# Patient Record
Sex: FEMALE | Race: ASIAN | NOT HISPANIC OR LATINO | URBAN - METROPOLITAN AREA
[De-identification: names, ages, dates, MRNs, and addresses within clinical notes are randomized per-mention and may not be internally consistent; named-entity substitution may affect disease eponyms.]

---

## 2019-10-30 RX ORDER — INSULIN DETEMIR 100/ML (3)
10 INSULIN PEN (ML) SUBCUTANEOUS
Qty: 0 | Refills: 0 | DISCHARGE
Start: 2019-10-30

## 2019-10-30 RX ORDER — INSULIN DETEMIR 100/ML (3)
30 INSULIN PEN (ML) SUBCUTANEOUS
Qty: 0 | Refills: 0 | DISCHARGE
Start: 2019-10-30

## 2019-11-01 ENCOUNTER — INPATIENT (INPATIENT)
Facility: HOSPITAL | Age: 79
LOS: 2 days | Discharge: HOME | End: 2019-11-04
Attending: HOSPITALIST | Admitting: HOSPITALIST
Payer: MEDICARE

## 2019-11-01 VITALS
HEART RATE: 83 BPM | RESPIRATION RATE: 20 BRPM | SYSTOLIC BLOOD PRESSURE: 140 MMHG | TEMPERATURE: 98 F | DIASTOLIC BLOOD PRESSURE: 58 MMHG | OXYGEN SATURATION: 100 %

## 2019-11-01 DIAGNOSIS — E03.9 HYPOTHYROIDISM, UNSPECIFIED: ICD-10-CM

## 2019-11-01 DIAGNOSIS — J18.9 PNEUMONIA, UNSPECIFIED ORGANISM: ICD-10-CM

## 2019-11-01 DIAGNOSIS — E11.9 TYPE 2 DIABETES MELLITUS WITHOUT COMPLICATIONS: ICD-10-CM

## 2019-11-01 DIAGNOSIS — R94.5 ABNORMAL RESULTS OF LIVER FUNCTION STUDIES: ICD-10-CM

## 2019-11-01 DIAGNOSIS — A41.9 SEPSIS, UNSPECIFIED ORGANISM: ICD-10-CM

## 2019-11-01 DIAGNOSIS — Z02.9 ENCOUNTER FOR ADMINISTRATIVE EXAMINATIONS, UNSPECIFIED: ICD-10-CM

## 2019-11-01 DIAGNOSIS — R06.02 SHORTNESS OF BREATH: ICD-10-CM

## 2019-11-01 DIAGNOSIS — Z29.9 ENCOUNTER FOR PROPHYLACTIC MEASURES, UNSPECIFIED: ICD-10-CM

## 2019-11-01 DIAGNOSIS — I10 ESSENTIAL (PRIMARY) HYPERTENSION: ICD-10-CM

## 2019-11-01 LAB
ALBUMIN SERPL ELPH-MCNC: 3.6 G/DL — SIGNIFICANT CHANGE UP (ref 3.3–5)
ALP SERPL-CCNC: 184 U/L — HIGH (ref 40–120)
ALT FLD-CCNC: 40 U/L — HIGH (ref 4–33)
ANION GAP SERPL CALC-SCNC: 17 MMO/L — HIGH (ref 7–14)
ANISOCYTOSIS BLD QL: SLIGHT — SIGNIFICANT CHANGE UP
APPEARANCE UR: CLEAR — SIGNIFICANT CHANGE UP
APTT BLD: 30.4 SEC — SIGNIFICANT CHANGE UP (ref 27.5–36.3)
AST SERPL-CCNC: 43 U/L — HIGH (ref 4–32)
B PERT DNA SPEC QL NAA+PROBE: NOT DETECTED — SIGNIFICANT CHANGE UP
BACTERIA # UR AUTO: NEGATIVE — SIGNIFICANT CHANGE UP
BASE EXCESS BLDV CALC-SCNC: 5.9 MMOL/L — SIGNIFICANT CHANGE UP
BASE EXCESS BLDV CALC-SCNC: 6.2 MMOL/L — SIGNIFICANT CHANGE UP
BASOPHILS # BLD AUTO: 0.02 K/UL — SIGNIFICANT CHANGE UP (ref 0–0.2)
BASOPHILS NFR BLD AUTO: 0.2 % — SIGNIFICANT CHANGE UP (ref 0–2)
BASOPHILS NFR SPEC: 0 % — SIGNIFICANT CHANGE UP (ref 0–2)
BILIRUB SERPL-MCNC: 0.9 MG/DL — SIGNIFICANT CHANGE UP (ref 0.2–1.2)
BILIRUB UR-MCNC: NEGATIVE — SIGNIFICANT CHANGE UP
BLOOD GAS VENOUS - CREATININE: 0.72 MG/DL — SIGNIFICANT CHANGE UP (ref 0.5–1.3)
BLOOD GAS VENOUS - CREATININE: 0.81 MG/DL — SIGNIFICANT CHANGE UP (ref 0.5–1.3)
BLOOD UR QL VISUAL: NEGATIVE — SIGNIFICANT CHANGE UP
BUN SERPL-MCNC: 8 MG/DL — SIGNIFICANT CHANGE UP (ref 7–23)
C PNEUM DNA SPEC QL NAA+PROBE: NOT DETECTED — SIGNIFICANT CHANGE UP
CALCIUM SERPL-MCNC: 9.6 MG/DL — SIGNIFICANT CHANGE UP (ref 8.4–10.5)
CHLORIDE BLDV-SCNC: 104 MMOL/L — SIGNIFICANT CHANGE UP (ref 96–108)
CHLORIDE BLDV-SCNC: 99 MMOL/L — SIGNIFICANT CHANGE UP (ref 96–108)
CHLORIDE SERPL-SCNC: 93 MMOL/L — LOW (ref 98–107)
CO2 SERPL-SCNC: 26 MMOL/L — SIGNIFICANT CHANGE UP (ref 22–31)
COLOR SPEC: SIGNIFICANT CHANGE UP
CREAT SERPL-MCNC: 0.84 MG/DL — SIGNIFICANT CHANGE UP (ref 0.5–1.3)
EOSINOPHIL # BLD AUTO: 0.06 K/UL — SIGNIFICANT CHANGE UP (ref 0–0.5)
EOSINOPHIL NFR BLD AUTO: 0.7 % — SIGNIFICANT CHANGE UP (ref 0–6)
EOSINOPHIL NFR FLD: 2 % — SIGNIFICANT CHANGE UP (ref 0–6)
FLUAV H1 2009 PAND RNA SPEC QL NAA+PROBE: DETECTED — HIGH
FLUAV H1 RNA SPEC QL NAA+PROBE: NOT DETECTED — SIGNIFICANT CHANGE UP
FLUAV H3 RNA SPEC QL NAA+PROBE: NOT DETECTED — SIGNIFICANT CHANGE UP
FLUBV RNA SPEC QL NAA+PROBE: NOT DETECTED — SIGNIFICANT CHANGE UP
GAS PNL BLDV: 133 MMOL/L — LOW (ref 136–146)
GAS PNL BLDV: 134 MMOL/L — LOW (ref 136–146)
GIANT PLATELETS BLD QL SMEAR: PRESENT — SIGNIFICANT CHANGE UP
GLUCOSE BLDC GLUCOMTR-MCNC: 215 MG/DL — HIGH (ref 70–99)
GLUCOSE BLDV-MCNC: 104 MG/DL — HIGH (ref 70–99)
GLUCOSE BLDV-MCNC: 75 MG/DL — SIGNIFICANT CHANGE UP (ref 70–99)
GLUCOSE SERPL-MCNC: 102 MG/DL — HIGH (ref 70–99)
GLUCOSE UR-MCNC: NEGATIVE — SIGNIFICANT CHANGE UP
HADV DNA SPEC QL NAA+PROBE: NOT DETECTED — SIGNIFICANT CHANGE UP
HCO3 BLDV-SCNC: 28 MMOL/L — HIGH (ref 20–27)
HCO3 BLDV-SCNC: 29 MMOL/L — HIGH (ref 20–27)
HCOV PNL SPEC NAA+PROBE: SIGNIFICANT CHANGE UP
HCT VFR BLD CALC: 34.6 % — SIGNIFICANT CHANGE UP (ref 34.5–45)
HCT VFR BLDV CALC: 34 % — LOW (ref 34.5–45)
HCT VFR BLDV CALC: 34.5 % — SIGNIFICANT CHANGE UP (ref 34.5–45)
HGB BLD-MCNC: 11.3 G/DL — LOW (ref 11.5–15.5)
HGB BLDV-MCNC: 11 G/DL — LOW (ref 11.5–15.5)
HGB BLDV-MCNC: 11.2 G/DL — LOW (ref 11.5–15.5)
HMPV RNA SPEC QL NAA+PROBE: NOT DETECTED — SIGNIFICANT CHANGE UP
HPIV1 RNA SPEC QL NAA+PROBE: NOT DETECTED — SIGNIFICANT CHANGE UP
HPIV2 RNA SPEC QL NAA+PROBE: NOT DETECTED — SIGNIFICANT CHANGE UP
HPIV3 RNA SPEC QL NAA+PROBE: NOT DETECTED — SIGNIFICANT CHANGE UP
HPIV4 RNA SPEC QL NAA+PROBE: NOT DETECTED — SIGNIFICANT CHANGE UP
HYALINE CASTS # UR AUTO: NEGATIVE — SIGNIFICANT CHANGE UP
IMM GRANULOCYTES NFR BLD AUTO: 5.7 % — HIGH (ref 0–1.5)
INR BLD: 0.84 — LOW (ref 0.88–1.17)
KETONES UR-MCNC: SIGNIFICANT CHANGE UP
LACTATE BLDV-MCNC: 1.8 MMOL/L — SIGNIFICANT CHANGE UP (ref 0.5–2)
LACTATE BLDV-MCNC: 2.4 MMOL/L — HIGH (ref 0.5–2)
LEUKOCYTE ESTERASE UR-ACNC: NEGATIVE — SIGNIFICANT CHANGE UP
LIDOCAIN IGE QN: 116.5 U/L — HIGH (ref 7–60)
LYMPHOCYTES # BLD AUTO: 0.83 K/UL — LOW (ref 1–3.3)
LYMPHOCYTES # BLD AUTO: 9.3 % — LOW (ref 13–44)
LYMPHOCYTES NFR SPEC AUTO: 6 % — LOW (ref 13–44)
MACROCYTES BLD QL: SLIGHT — SIGNIFICANT CHANGE UP
MANUAL SMEAR VERIFICATION: SIGNIFICANT CHANGE UP
MCHC RBC-ENTMCNC: 26.6 PG — LOW (ref 27–34)
MCHC RBC-ENTMCNC: 32.7 % — SIGNIFICANT CHANGE UP (ref 32–36)
MCV RBC AUTO: 81.4 FL — SIGNIFICANT CHANGE UP (ref 80–100)
MONOCYTES # BLD AUTO: 0.9 K/UL — SIGNIFICANT CHANGE UP (ref 0–0.9)
MONOCYTES NFR BLD AUTO: 10 % — SIGNIFICANT CHANGE UP (ref 2–14)
MONOCYTES NFR BLD: 8 % — SIGNIFICANT CHANGE UP (ref 2–9)
NEUTROPHIL AB SER-ACNC: 73 % — SIGNIFICANT CHANGE UP (ref 43–77)
NEUTROPHILS # BLD AUTO: 6.64 K/UL — SIGNIFICANT CHANGE UP (ref 1.8–7.4)
NEUTROPHILS NFR BLD AUTO: 74.1 % — SIGNIFICANT CHANGE UP (ref 43–77)
NEUTS BAND # BLD: 10 % — HIGH (ref 0–6)
NITRITE UR-MCNC: NEGATIVE — SIGNIFICANT CHANGE UP
NRBC # BLD: 0 /100WBC — SIGNIFICANT CHANGE UP
NRBC # FLD: 0 K/UL — SIGNIFICANT CHANGE UP (ref 0–0)
PCO2 BLDV: 42 MMHG — SIGNIFICANT CHANGE UP (ref 41–51)
PCO2 BLDV: 52 MMHG — HIGH (ref 41–51)
PH BLDV: 7.39 PH — SIGNIFICANT CHANGE UP (ref 7.32–7.43)
PH BLDV: 7.47 PH — HIGH (ref 7.32–7.43)
PH UR: 7 — SIGNIFICANT CHANGE UP (ref 5–8)
PLATELET # BLD AUTO: 392 K/UL — SIGNIFICANT CHANGE UP (ref 150–400)
PLATELET COUNT - ESTIMATE: NORMAL — SIGNIFICANT CHANGE UP
PMV BLD: 9.6 FL — SIGNIFICANT CHANGE UP (ref 7–13)
PO2 BLDV: 27 MMHG — LOW (ref 35–40)
PO2 BLDV: < 24 MMHG — LOW (ref 35–40)
POLYCHROMASIA BLD QL SMEAR: SLIGHT — SIGNIFICANT CHANGE UP
POTASSIUM BLDV-SCNC: 3 MMOL/L — LOW (ref 3.4–4.5)
POTASSIUM BLDV-SCNC: 3.1 MMOL/L — LOW (ref 3.4–4.5)
POTASSIUM SERPL-MCNC: 3.1 MMOL/L — LOW (ref 3.5–5.3)
POTASSIUM SERPL-SCNC: 3.1 MMOL/L — LOW (ref 3.5–5.3)
PROT SERPL-MCNC: 7.4 G/DL — SIGNIFICANT CHANGE UP (ref 6–8.3)
PROT UR-MCNC: 20 — SIGNIFICANT CHANGE UP
PROTHROM AB SERPL-ACNC: 9.5 SEC — LOW (ref 9.8–13.1)
RBC # BLD: 4.25 M/UL — SIGNIFICANT CHANGE UP (ref 3.8–5.2)
RBC # FLD: 14.9 % — HIGH (ref 10.3–14.5)
RBC CASTS # UR COMP ASSIST: SIGNIFICANT CHANGE UP (ref 0–?)
RSV RNA SPEC QL NAA+PROBE: NOT DETECTED — SIGNIFICANT CHANGE UP
RV+EV RNA SPEC QL NAA+PROBE: NOT DETECTED — SIGNIFICANT CHANGE UP
SAO2 % BLDV: 33.9 % — LOW (ref 60–85)
SAO2 % BLDV: 45.7 % — LOW (ref 60–85)
SODIUM SERPL-SCNC: 136 MMOL/L — SIGNIFICANT CHANGE UP (ref 135–145)
SP GR SPEC: 1.01 — SIGNIFICANT CHANGE UP (ref 1–1.04)
SQUAMOUS # UR AUTO: SIGNIFICANT CHANGE UP
TSH SERPL-MCNC: 25.43 UIU/ML — HIGH (ref 0.27–4.2)
UROBILINOGEN FLD QL: NORMAL — SIGNIFICANT CHANGE UP
VARIANT LYMPHS # BLD: 1 % — SIGNIFICANT CHANGE UP
WBC # BLD: 8.96 K/UL — SIGNIFICANT CHANGE UP (ref 3.8–10.5)
WBC # FLD AUTO: 8.96 K/UL — SIGNIFICANT CHANGE UP (ref 3.8–10.5)
WBC UR QL: SIGNIFICANT CHANGE UP (ref 0–?)

## 2019-11-01 PROCEDURE — 76705 ECHO EXAM OF ABDOMEN: CPT | Mod: 26

## 2019-11-01 PROCEDURE — 71046 X-RAY EXAM CHEST 2 VIEWS: CPT | Mod: 26

## 2019-11-01 PROCEDURE — 99223 1ST HOSP IP/OBS HIGH 75: CPT | Mod: GC

## 2019-11-01 RX ORDER — SODIUM CHLORIDE 9 MG/ML
1000 INJECTION, SOLUTION INTRAVENOUS
Refills: 0 | Status: DISCONTINUED | OUTPATIENT
Start: 2019-11-01 | End: 2019-11-04

## 2019-11-01 RX ORDER — DEXTROSE 50 % IN WATER 50 %
25 SYRINGE (ML) INTRAVENOUS ONCE
Refills: 0 | Status: DISCONTINUED | OUTPATIENT
Start: 2019-11-01 | End: 2019-11-04

## 2019-11-01 RX ORDER — DEXTROSE 50 % IN WATER 50 %
15 SYRINGE (ML) INTRAVENOUS ONCE
Refills: 0 | Status: DISCONTINUED | OUTPATIENT
Start: 2019-11-01 | End: 2019-11-04

## 2019-11-01 RX ORDER — POTASSIUM CHLORIDE 20 MEQ
40 PACKET (EA) ORAL ONCE
Refills: 0 | Status: COMPLETED | OUTPATIENT
Start: 2019-11-01 | End: 2019-11-01

## 2019-11-01 RX ORDER — GLUCAGON INJECTION, SOLUTION 0.5 MG/.1ML
1 INJECTION, SOLUTION SUBCUTANEOUS ONCE
Refills: 0 | Status: DISCONTINUED | OUTPATIENT
Start: 2019-11-01 | End: 2019-11-04

## 2019-11-01 RX ORDER — NIACIN 50 MG
1 TABLET ORAL
Qty: 0 | Refills: 0 | DISCHARGE

## 2019-11-01 RX ORDER — SODIUM CHLORIDE 9 MG/ML
1000 INJECTION INTRAMUSCULAR; INTRAVENOUS; SUBCUTANEOUS ONCE
Refills: 0 | Status: COMPLETED | OUTPATIENT
Start: 2019-11-01 | End: 2019-11-01

## 2019-11-01 RX ORDER — DEXTROSE 50 % IN WATER 50 %
12.5 SYRINGE (ML) INTRAVENOUS ONCE
Refills: 0 | Status: DISCONTINUED | OUTPATIENT
Start: 2019-11-01 | End: 2019-11-04

## 2019-11-01 RX ORDER — AMLODIPINE BESYLATE 2.5 MG/1
5 TABLET ORAL DAILY
Refills: 0 | Status: DISCONTINUED | OUTPATIENT
Start: 2019-11-01 | End: 2019-11-04

## 2019-11-01 RX ORDER — VANCOMYCIN HCL 1 G
750 VIAL (EA) INTRAVENOUS EVERY 12 HOURS
Refills: 0 | Status: DISCONTINUED | OUTPATIENT
Start: 2019-11-01 | End: 2019-11-02

## 2019-11-01 RX ORDER — INSULIN LISPRO 100/ML
VIAL (ML) SUBCUTANEOUS AT BEDTIME
Refills: 0 | Status: DISCONTINUED | OUTPATIENT
Start: 2019-11-01 | End: 2019-11-04

## 2019-11-01 RX ORDER — PIPERACILLIN AND TAZOBACTAM 4; .5 G/20ML; G/20ML
3.38 INJECTION, POWDER, LYOPHILIZED, FOR SOLUTION INTRAVENOUS ONCE
Refills: 0 | Status: COMPLETED | OUTPATIENT
Start: 2019-11-01 | End: 2019-11-01

## 2019-11-01 RX ORDER — VANCOMYCIN HCL 1 G
1000 VIAL (EA) INTRAVENOUS ONCE
Refills: 0 | Status: COMPLETED | OUTPATIENT
Start: 2019-11-01 | End: 2019-11-01

## 2019-11-01 RX ORDER — PIPERACILLIN AND TAZOBACTAM 4; .5 G/20ML; G/20ML
3.38 INJECTION, POWDER, LYOPHILIZED, FOR SOLUTION INTRAVENOUS EVERY 8 HOURS
Refills: 0 | Status: DISCONTINUED | OUTPATIENT
Start: 2019-11-01 | End: 2019-11-02

## 2019-11-01 RX ORDER — LEVOTHYROXINE SODIUM 125 MCG
88 TABLET ORAL DAILY
Refills: 0 | Status: DISCONTINUED | OUTPATIENT
Start: 2019-11-01 | End: 2019-11-02

## 2019-11-01 RX ORDER — IPRATROPIUM/ALBUTEROL SULFATE 18-103MCG
3 AEROSOL WITH ADAPTER (GRAM) INHALATION EVERY 6 HOURS
Refills: 0 | Status: DISCONTINUED | OUTPATIENT
Start: 2019-11-01 | End: 2019-11-04

## 2019-11-01 RX ORDER — LISINOPRIL 2.5 MG/1
1 TABLET ORAL
Qty: 0 | Refills: 0 | DISCHARGE

## 2019-11-01 RX ORDER — ENOXAPARIN SODIUM 100 MG/ML
40 INJECTION SUBCUTANEOUS DAILY
Refills: 0 | Status: DISCONTINUED | OUTPATIENT
Start: 2019-11-01 | End: 2019-11-04

## 2019-11-01 RX ORDER — INSULIN LISPRO 100/ML
VIAL (ML) SUBCUTANEOUS
Refills: 0 | Status: DISCONTINUED | OUTPATIENT
Start: 2019-11-01 | End: 2019-11-04

## 2019-11-01 RX ADMIN — Medication 30 MILLIGRAM(S): at 23:37

## 2019-11-01 RX ADMIN — PIPERACILLIN AND TAZOBACTAM 25 GRAM(S): 4; .5 INJECTION, POWDER, LYOPHILIZED, FOR SOLUTION INTRAVENOUS at 23:38

## 2019-11-01 RX ADMIN — Medication 250 MILLIGRAM(S): at 17:08

## 2019-11-01 RX ADMIN — PIPERACILLIN AND TAZOBACTAM 200 GRAM(S): 4; .5 INJECTION, POWDER, LYOPHILIZED, FOR SOLUTION INTRAVENOUS at 10:36

## 2019-11-01 RX ADMIN — SODIUM CHLORIDE 1000 MILLILITER(S): 9 INJECTION INTRAMUSCULAR; INTRAVENOUS; SUBCUTANEOUS at 10:36

## 2019-11-01 RX ADMIN — Medication 40 MILLIEQUIVALENT(S): at 12:46

## 2019-11-01 NOTE — H&P ADULT - HISTORY OF PRESENT ILLNESS
79F hx of T2DM, HTN, hypothyroidism and recent hospitalization for septic shock 2/2 klebsiella bacteremia due to cholangitis (in [place]) presenting with one day of shortness of breath. Per patient and medical record review, patient was visiting family in [facility] and while there she developed cough and RUQ pain. During flight back to [facility], patient developed confusion and pre-syncopal event and the flight was diverted to [facility]. She was taken to hospital where she was admitted from 10/24-10/29/19. Per review of records, CTPA was negative for PE. She initially required pressor support and was admitted to the ICU for septic shock 2/2 Klebsiella pneumoniae due to presumed ascending cholangitis. Abs US showed dilated GB w/ dilation of intra-hepatic ducts. She was discharged on Cipro 500mg BID to be completed on 11/5. Patient returned to [facility] on 10/29 and saw her PCP the next day. She was taken off her diabetic medications and started on Lantus 30U qHS. She states that she was feeling well until this AM, where she developed shortness of breath. 79F hx of T2DM, HTN, hypothyroidism and recent hospitalization for septic shock 2/2 klebsiella bacteremia due to cholangitis (in [place]) presenting with one day of shortness of breath. Per patient and medical record review, patient was visiting family in [facility] and while there she developed cough and RUQ pain. During flight back to [facility], patient developed confusion and pre-syncopal event and the flight was diverted to [facility]. She was taken to hospital where she was admitted from 10/24-10/29/19. Per review of records, CTPA was negative for PE. She initially required pressor support and was admitted to the ICU for septic shock 2/2 Klebsiella pneumoniae due to presumed ascending cholangitis. Abs US showed dilated GB w/ dilation of intra-hepatic ducts. She was discharged on Cipro 500mg BID to be completed on 11/5.     Patient returned to [facility] on 10/29 and saw her PCP the next day. She was taken off her diabetic medications and started on Lantus 30U qHS. She states that she was feeling well until this AM, where she developed shortness of breath. She also endorsing alternating chills and fevers, weakness and productive cough. She states her abdomen has been distended since hospitalization in [place] and endorses feeling "bloated", which improves when she belches. She denies dysuria or increased frequency.

## 2019-11-01 NOTE — H&P ADULT - PROBLEM SELECTOR PLAN 8
1.  Name of PCP: Dr. Vetnura ([place])   2.  PCP Contacted on Admission: [ ] Y    [ ] N    3.  PCP contacted at Discharge: [ ] Y    [ ] N    [ ] N/A  4.  Post-Discharge Appointment Date and Location:  5.  Summary of Handoff given to PCP:

## 2019-11-01 NOTE — ED ADULT TRIAGE NOTE - CHIEF COMPLAINT QUOTE
arrived from [facility],diverted to [place],treated for pneumonia/septic shock as per family,returned from [place] to home on tues.  reports diff breathing since  x past wk with continued weakness,cough. denies pain. fs by ems 65 arrived from [facility],diverted to [place],treated for pneumonia/septic shock as per family,returned from [place] to home on tues.  reports diff breathing since  x past wk with continued weakness,cough. denies pain. fs by ems 67.  fs 60 at present, apple juice given.

## 2019-11-01 NOTE — H&P ADULT - ASSESSMENT
79F hx of T2DM, HTN, hypothyroidism and recent hospitalization for septic shock 2/2 klebsiella bacteremia due to cholangitis (in [place]) presenting with one day of shortness of breath admitted w/ sepsis likely 2/2 PNA. 79F hx of T2DM, HTN, hypothyroidism and recent hospitalization for septic shock 2/2 klebsiella bacteremia due to cholangitis (in [place]) presenting with one day of shortness of breath admitted w/ sepsis likely 2/2 HCAP.

## 2019-11-01 NOTE — ED ADULT NURSE REASSESSMENT NOTE - NS ED NURSE REASSESS COMMENT FT1
Pt awaiting RVP results at this time. Pt denies shortness of breath, breathing even and unlabored. Will continue to monitor.

## 2019-11-01 NOTE — H&P ADULT - NSHPREVIEWOFSYSTEMS_GEN_ALL_CORE
CONSTITUTIONAL: +weakness, fevers or chills  EYES: No visual changes; no blurry vision  ENT: +sore throat w/ swallowing    NECK: No pain or stiffness  RESPIRATORY: +cough and SOB. Denies wheezing, hemoptysis  CARDIOVASCULAR: No chest pain or palpitations; no pleuritic chest pain   GASTROINTESTINAL: No abdominal or epigastric pain. No nausea, vomiting, or hematemesis; No diarrhea or constipation. No melena or hematochezia.  GENITOURINARY: No dysuria, frequency or hematuria  NEUROLOGICAL: No numbness or weakness; no headaches   PSYCH: No SI/HI  SKIN: No itching, rashes

## 2019-11-01 NOTE — H&P ADULT - NSHPLABSRESULTS_GEN_ALL_CORE
EKG personally reviewed: NSR, no YANICK
Airway patent, R TM bulging and erythematous, L TM clear, normal appearing mouth, nose, throat, neck supple with full range of motion, no cervical adenopathy.

## 2019-11-01 NOTE — H&P ADULT - NSICDXPASTMEDICALHX_GEN_ALL_CORE_FT
PAST MEDICAL HISTORY:  Hypertension     Hypothyroidism     Sepsis due to Klebsiella     Type 2 diabetes mellitus

## 2019-11-01 NOTE — H&P ADULT - PROBLEM SELECTOR PLAN 5
- BP well controlled at this time  - Given concern for sepsis, will hold home lisinopril and restart amlodipine w/ hold parameters - C/w home synthroid 88mcg   - F/u TSH level

## 2019-11-01 NOTE — H&P ADULT - PROBLEM SELECTOR PLAN 4
- C/w home synthroid 88mcg   - F/u TSH level - A1c in AM  - Patient hypoglycemic to 60 in ED and was started on Lantus 30U qHS by PCP a few days ago. She was previously on Metformin Glipizide and Sitapliptin, which patient states were just recently stopped    - Will hold all long acting insulin due to hypoglycemic event and monitor FS qAC and qHS on low dose ISS

## 2019-11-01 NOTE — H&P ADULT - PROBLEM SELECTOR PLAN 3
- A1c in AM  - Patient hypoglycemic to 60 in ED and was started on Lantus 30U qHS by PCP a few days ago. She was previously on Metformin Glipizide and Sitapliptin, which patient states were just recently stopped    - Will hold all long acting insulin due to hypoglycemic event and monitor FS qAC and qHS on low dose ISS - Patient w/ recent admission for septic shock due to presumed ascending cholangitis at OSH   - ERCP was deferred at that time and patient had similar abd US findings of GB distention and intrahepatic biliary ductal dilation.   - Currently patient denies RUQ pain.   - Will hold off CT A/P as patient had recent imaging at OSH  - Consider GI eval in AM for possible ERCP given recent hx    - Trend LFTs in AM

## 2019-11-01 NOTE — ED PROVIDER NOTE - CLINICAL SUMMARY MEDICAL DECISION MAKING FREE TEXT BOX
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 Concnern PNA (bacterial vs. viral) given symptoms and hx. Will check labs and perform infectious workup. EKG, CXR, IVF. Will reassess the need for additional interventions as clinically warranted.

## 2019-11-01 NOTE — ED ADULT NURSE NOTE - CHIEF COMPLAINT QUOTE
arrived from [facility],diverted to [place],treated for pneumonia/septic shock as per family,returned from [place] to home on tues.  reports diff breathing since  x past wk with continued weakness,cough. denies pain. fs by ems 67.  fs 60 at present, apple juice given.

## 2019-11-01 NOTE — ED PROVIDER NOTE - ATTENDING CONTRIBUTION TO CARE
79F presents with SOB and cough. Recent diagnosis of sepsis with Klebsiella bacteremia, was on a flight returning from the [facility] last week (Tuesday) when she passed out on the flight, the flight was diverted emergency to [place], where she was taken to the hospital and admitted to the ICU with sepsis, treated w broad spectrum abx, and required pressors. Possible source from cholecystitis because intrahepatic biliary dilation seen on CT abd. Two days ago, patient was discharged to travel back to [place], has been taking cipro but now with SOB and cough.  + fever. Now no abd pain. + generalized weakness.  On exam mild tachypnea, mmm, rrr, lungs decreased BS b/l bases, abd soft NT/ND, 2+ pulses, no edema, no rash, alert, speech clear. CXR with b/l pleural effusions and potential infiltrate, patients treated w broad spectrum abx given recent sepsis. RUQ u/s with similar dilation, LFTs wnl. Plan for admission.

## 2019-11-01 NOTE — ED ADULT NURSE NOTE - OBJECTIVE STATEMENT
Pt presents to ED with sudden onset of shortness of breath that started this morning. Pt AxOx3. Pt had recently travelled from the [facility] passed out on the flight and then had to go to [place] to be admitted to the hospital for septic shock. Pt was discharged approx a week ago and followed up with her MD two days ago. as per daughter patient was fine last night. Pt then called her this morning and started to say the shortness of breath was bad. Pt denies chest pain, lightheadedness and dizziness. Pt breathing even and unlabored. Pt O2 sat 98% on room air. Pt abd soft and nontender. Pt denies abd pain, n/v/d. Pt denies dysuria and hematuria. Skin clean dry and intact. 20G IVL placed in the R arm. Rectal temp 96.4, MD Ferrara made aware, pt placed on daryl hugger at this time. Will continue to monitor.

## 2019-11-01 NOTE — H&P ADULT - PROBLEM SELECTOR PLAN 2
- Likely in - Likely in pneumonia   - CXR w/ hazy opacities and increased marking, on PE patient w/ coarse BS/crackles in RLL concerning for PNA  - Will treat as above   - Duonebs  - Incentive spirometry  - Patient w/ small bilateral pleural effusion. Had TTE @ OSH w/ normal LVSF and mildly reduced RV systolic function. Will avoid further IVF resuscitation at this time to avoid volume overload. - Likely in pneumonia   - CXR w/ hazy opacities and increased marking, on PE patient w/ coarse BS/crackles in RLL concerning for PNA  - Will treat as above   - Duonebs  - Incentive spirometry  - Patient w/ small bilateral pleural effusion on CXR. Had TTE @ OSH w/ normal LVSF and mildly reduced RV systolic function. Will avoid further IVF resuscitation at this time to avoid volume overload.

## 2019-11-01 NOTE — ED ADULT NURSE NOTE - NS ED NOTE ABUSE SUSPICION NEGLECT YN
Patient called back regarding her medication issue and getting a referral to a psychiatrist.      Please call to discuss and okay to leave detailed message.    No

## 2019-11-01 NOTE — ED PROVIDER NOTE - PMH
GERD (gastroesophageal reflux disease)    HLD (hyperlipidemia)    Hypertension    Hypothyroidism    Sepsis due to Klebsiella    Type 2 diabetes mellitus

## 2019-11-01 NOTE — H&P ADULT - PROBLEM SELECTOR PLAN 6
- DVT ppx: lovenox  - Diet: regular  - PT consult - BP well controlled at this time  - Given concern for sepsis, will hold home lisinopril and restart amlodipine w/ hold parameters

## 2019-11-01 NOTE — ED PROVIDER NOTE - OBJECTIVE STATEMENT
78 y/o F w/ PMH of HLD, HTN, Hypothyroid, GERD, DM, prior CVA w/o residual deficits w/ recent hospitalization for Klebsiella sepsis presenting w/ shortness of breath. Pt presents w/ family members. Pt reports shortness of breath that started this morning. Pt called her niece this morning complaining of difficulty breathing and niece called EMS. Pt w/ recent hospitalization in [place] for (per chart review) klebisella, requiring MICU and pressors. Paperwork indicating pt likely was experiencing cholangitis at that time. Pt currently on cipro PO. Pt saw her PCP yesterday and reports feeling well then. Reports chills w/ associated weakness and cough now. Denies fevers, headache, dizziness, blurry vision, chest pain, cough, abdominal pain, n/v/d/c, urinary symptoms, MSK pain, rash.

## 2019-11-01 NOTE — H&P ADULT - NSHPPHYSICALEXAM_GEN_ALL_CORE
GENERAL: NAD, well-developed  HEAD:  Atraumatic, Normocephalic  EYES: EOMI, PERRLA, conjunctiva and sclera clear  ENT: Dry mucus membranes   NECK: Supple, No JVD, no LND   CHEST/LUNG: Crackles at R base; No wheeze/rales/rhonchi   HEART: Regular rate and rhythm; No murmurs, rubs, or gallops  ABDOMEN: Soft, Nontender, mildly distended; Bowel sounds present  EXTREMITIES:  2+ Peripheral Pulses, No clubbing, cyanosis. 1+ edema bilaterally   PSYCH: Appropriate affect   NEUROLOGY: non-focal neuro exam, A&Ox3, moving all ext   SKIN: No rashes or lesions

## 2019-11-01 NOTE — H&P ADULT - PROBLEM SELECTOR PLAN 7
1.  Name of PCP: Dr. Ventura ([place])   2.  PCP Contacted on Admission: [ ] Y    [ ] N    3.  PCP contacted at Discharge: [ ] Y    [ ] N    [ ] N/A  4.  Post-Discharge Appointment Date and Location:  5.  Summary of Handoff given to PCP: - DVT ppx: lovenox  - Diet: regular  - PT consult

## 2019-11-02 LAB
ALBUMIN SERPL ELPH-MCNC: 3 G/DL — LOW (ref 3.3–5)
ALP SERPL-CCNC: 181 U/L — HIGH (ref 40–120)
ALT FLD-CCNC: 33 U/L — SIGNIFICANT CHANGE UP (ref 4–33)
ANION GAP SERPL CALC-SCNC: 13 MMO/L — SIGNIFICANT CHANGE UP (ref 7–14)
AST SERPL-CCNC: 37 U/L — HIGH (ref 4–32)
BACTERIA UR CULT: SIGNIFICANT CHANGE UP
BASOPHILS # BLD AUTO: 0.07 K/UL — SIGNIFICANT CHANGE UP (ref 0–0.2)
BASOPHILS NFR BLD AUTO: 0.8 % — SIGNIFICANT CHANGE UP (ref 0–2)
BILIRUB SERPL-MCNC: 0.7 MG/DL — SIGNIFICANT CHANGE UP (ref 0.2–1.2)
BUN SERPL-MCNC: 7 MG/DL — SIGNIFICANT CHANGE UP (ref 7–23)
CALCIUM SERPL-MCNC: 9 MG/DL — SIGNIFICANT CHANGE UP (ref 8.4–10.5)
CHLORIDE SERPL-SCNC: 96 MMOL/L — LOW (ref 98–107)
CO2 SERPL-SCNC: 24 MMOL/L — SIGNIFICANT CHANGE UP (ref 22–31)
CREAT SERPL-MCNC: 0.89 MG/DL — SIGNIFICANT CHANGE UP (ref 0.5–1.3)
EOSINOPHIL # BLD AUTO: 0.24 K/UL — SIGNIFICANT CHANGE UP (ref 0–0.5)
EOSINOPHIL NFR BLD AUTO: 2.9 % — SIGNIFICANT CHANGE UP (ref 0–6)
GLUCOSE BLDC GLUCOMTR-MCNC: 264 MG/DL — HIGH (ref 70–99)
GLUCOSE BLDC GLUCOMTR-MCNC: 277 MG/DL — HIGH (ref 70–99)
GLUCOSE BLDC GLUCOMTR-MCNC: 278 MG/DL — HIGH (ref 70–99)
GLUCOSE BLDC GLUCOMTR-MCNC: 282 MG/DL — HIGH (ref 70–99)
GLUCOSE SERPL-MCNC: 295 MG/DL — HIGH (ref 70–99)
HBA1C BLD-MCNC: 10.5 % — HIGH (ref 4–5.6)
HCT VFR BLD CALC: 32.6 % — LOW (ref 34.5–45)
HGB BLD-MCNC: 10.1 G/DL — LOW (ref 11.5–15.5)
IMM GRANULOCYTES NFR BLD AUTO: 4.5 % — HIGH (ref 0–1.5)
LYMPHOCYTES # BLD AUTO: 1.25 K/UL — SIGNIFICANT CHANGE UP (ref 1–3.3)
LYMPHOCYTES # BLD AUTO: 14.9 % — SIGNIFICANT CHANGE UP (ref 13–44)
MAGNESIUM SERPL-MCNC: 1.8 MG/DL — SIGNIFICANT CHANGE UP (ref 1.6–2.6)
MCHC RBC-ENTMCNC: 26.2 PG — LOW (ref 27–34)
MCHC RBC-ENTMCNC: 31 % — LOW (ref 32–36)
MCV RBC AUTO: 84.5 FL — SIGNIFICANT CHANGE UP (ref 80–100)
MONOCYTES # BLD AUTO: 1.04 K/UL — HIGH (ref 0–0.9)
MONOCYTES NFR BLD AUTO: 12.4 % — SIGNIFICANT CHANGE UP (ref 2–14)
NEUTROPHILS # BLD AUTO: 5.42 K/UL — SIGNIFICANT CHANGE UP (ref 1.8–7.4)
NEUTROPHILS NFR BLD AUTO: 64.5 % — SIGNIFICANT CHANGE UP (ref 43–77)
NRBC # FLD: 0 K/UL — SIGNIFICANT CHANGE UP (ref 0–0)
PHOSPHATE SERPL-MCNC: 3 MG/DL — SIGNIFICANT CHANGE UP (ref 2.5–4.5)
PLATELET # BLD AUTO: 439 K/UL — HIGH (ref 150–400)
PMV BLD: 9.8 FL — SIGNIFICANT CHANGE UP (ref 7–13)
POTASSIUM SERPL-MCNC: 3.7 MMOL/L — SIGNIFICANT CHANGE UP (ref 3.5–5.3)
POTASSIUM SERPL-SCNC: 3.7 MMOL/L — SIGNIFICANT CHANGE UP (ref 3.5–5.3)
PROT SERPL-MCNC: 6 G/DL — SIGNIFICANT CHANGE UP (ref 6–8.3)
RBC # BLD: 3.86 M/UL — SIGNIFICANT CHANGE UP (ref 3.8–5.2)
RBC # FLD: 15.6 % — HIGH (ref 10.3–14.5)
SODIUM SERPL-SCNC: 133 MMOL/L — LOW (ref 135–145)
SPECIMEN SOURCE: SIGNIFICANT CHANGE UP
T3 SERPL-MCNC: 37.2 NG/DL — LOW (ref 80–200)
T4 FREE SERPL-MCNC: 0.64 NG/DL — LOW (ref 0.9–1.8)
WBC # BLD: 8.4 K/UL — SIGNIFICANT CHANGE UP (ref 3.8–10.5)
WBC # FLD AUTO: 8.4 K/UL — SIGNIFICANT CHANGE UP (ref 3.8–10.5)

## 2019-11-02 PROCEDURE — 99223 1ST HOSP IP/OBS HIGH 75: CPT | Mod: GC

## 2019-11-02 PROCEDURE — 99233 SBSQ HOSP IP/OBS HIGH 50: CPT

## 2019-11-02 RX ORDER — CEFTRIAXONE 500 MG/1
1000 INJECTION, POWDER, FOR SOLUTION INTRAMUSCULAR; INTRAVENOUS EVERY 24 HOURS
Refills: 0 | Status: DISCONTINUED | OUTPATIENT
Start: 2019-11-02 | End: 2019-11-04

## 2019-11-02 RX ORDER — LEVOTHYROXINE SODIUM 125 MCG
100 TABLET ORAL DAILY
Refills: 0 | Status: DISCONTINUED | OUTPATIENT
Start: 2019-11-02 | End: 2019-11-04

## 2019-11-02 RX ORDER — INSULIN GLARGINE 100 [IU]/ML
5 INJECTION, SOLUTION SUBCUTANEOUS AT BEDTIME
Refills: 0 | Status: DISCONTINUED | OUTPATIENT
Start: 2019-11-02 | End: 2019-11-04

## 2019-11-02 RX ORDER — LANOLIN ALCOHOL/MO/W.PET/CERES
3 CREAM (GRAM) TOPICAL AT BEDTIME
Refills: 0 | Status: DISCONTINUED | OUTPATIENT
Start: 2019-11-02 | End: 2019-11-04

## 2019-11-02 RX ORDER — CEFTRIAXONE 500 MG/1
INJECTION, POWDER, FOR SOLUTION INTRAMUSCULAR; INTRAVENOUS
Refills: 0 | Status: DISCONTINUED | OUTPATIENT
Start: 2019-11-02 | End: 2019-11-02

## 2019-11-02 RX ADMIN — Medication 1: at 22:29

## 2019-11-02 RX ADMIN — AMLODIPINE BESYLATE 5 MILLIGRAM(S): 2.5 TABLET ORAL at 05:31

## 2019-11-02 RX ADMIN — PIPERACILLIN AND TAZOBACTAM 25 GRAM(S): 4; .5 INJECTION, POWDER, LYOPHILIZED, FOR SOLUTION INTRAVENOUS at 06:18

## 2019-11-02 RX ADMIN — PIPERACILLIN AND TAZOBACTAM 25 GRAM(S): 4; .5 INJECTION, POWDER, LYOPHILIZED, FOR SOLUTION INTRAVENOUS at 15:43

## 2019-11-02 RX ADMIN — INSULIN GLARGINE 5 UNIT(S): 100 INJECTION, SOLUTION SUBCUTANEOUS at 22:29

## 2019-11-02 RX ADMIN — Medication 3: at 17:39

## 2019-11-02 RX ADMIN — Medication 3 MILLILITER(S): at 15:45

## 2019-11-02 RX ADMIN — Medication 88 MICROGRAM(S): at 05:31

## 2019-11-02 RX ADMIN — Medication 30 MILLIGRAM(S): at 17:39

## 2019-11-02 RX ADMIN — Medication 3 MILLIGRAM(S): at 20:45

## 2019-11-02 RX ADMIN — Medication 3: at 08:34

## 2019-11-02 RX ADMIN — Medication 30 MILLIGRAM(S): at 05:31

## 2019-11-02 RX ADMIN — Medication 250 MILLIGRAM(S): at 05:31

## 2019-11-02 RX ADMIN — ENOXAPARIN SODIUM 40 MILLIGRAM(S): 100 INJECTION SUBCUTANEOUS at 12:35

## 2019-11-02 RX ADMIN — Medication 3: at 12:35

## 2019-11-02 RX ADMIN — CEFTRIAXONE 100 MILLIGRAM(S): 500 INJECTION, POWDER, FOR SOLUTION INTRAMUSCULAR; INTRAVENOUS at 22:28

## 2019-11-02 RX ADMIN — Medication 3 MILLILITER(S): at 09:22

## 2019-11-02 RX ADMIN — Medication 3 MILLILITER(S): at 20:54

## 2019-11-02 NOTE — PROGRESS NOTE ADULT - PROBLEM SELECTOR PROBLEM 7
[facility] is calling to clarify dose of warfarin. Yesterday Dr. Cho changed dosage to 1 mg Sun, Wed, Fri and 2 mg all other days. Do you want to continue with your current order of 2 mg tonight and recheck on 4/20?  Please advise    Prophylactic measure

## 2019-11-02 NOTE — PROGRESS NOTE ADULT - PROBLEM SELECTOR PLAN 8
1.  Name of PCP: Dr. Ventura ([place])   2.  PCP Contacted on Admission: [ ] Y    [ ] N    3.  PCP contacted at Discharge: [ ] Y    [ ] N    [ ] N/A  4.  Post-Discharge Appointment Date and Location:  5.  Summary of Handoff given to PCP:

## 2019-11-02 NOTE — PROGRESS NOTE ADULT - PROBLEM SELECTOR PLAN 3
- Patient w/ recent admission for septic shock due to presumed ascending cholangitis at OSH   - ERCP was deferred at that time and patient had similar abd US findings of GB distention and intrahepatic biliary ductal dilation.   - Currently patient denies RUQ pain.   - Will hold off CT A/P as patient had recent imaging at OSH  - will request  GI eval  for possible ERCP given recent hx    - Trend LFTs , trending down

## 2019-11-02 NOTE — CONSULT NOTE ADULT - ASSESSMENT
79F hx of T2DM, HTN, hypothyroidism and recent hospitalization for septic shock 2/2 klebsiella bacteremia due to cholangitis (in [place]) presenting with one day of shortness of breath.   Patient was visiting family in [facility] and while there she developed cough and RUQ pain. During flight back to [facility], patient developed confusion and pre-syncopal event and the flight was diverted to [facility]. She was taken to hospital where she was admitted from 10/24-10/29/19 for septic shick from klebsiella bacteremia from cholangitis-Abs US showed dilated GB w/ dilation of intra-hepatic ducts. She was discharged on Cipro 500mg BID to be completed on 11/5.   Here patient is afebrile, no leukocytosis but 10% bands, x ray s/o consolidation, USG with dilation of intra hepatic ducts similar to records from [place].   Lipase level is 116.5. Blood cx and urine cx are negative.   Currently on Vancomycin 750 q12 and Zosyn 3.375 q8. RVP positive for flu- Tamiflu 79F hx of T2DM, HTN, hypothyroidism and recent hospitalization for septic shock 2/2 klebsiella bacteremia due to cholangitis (in [place]) presenting with one day of shortness of breath.   Patient was visiting family in [facility] and while there she developed cough and RUQ pain. During flight back to [facility], patient developed confusion and pre-syncopal event and the flight was diverted to [place]. She was taken to hospital where she was admitted from 10/24-10/29/19 for septic shock from klebsiella bacteremia from cholangitis-Abs US showed dilated GB w/ dilation of intra-hepatic ducts. She was discharged on Cipro 500mg BID to be completed on 11/5.   Here patient is afebrile, no leukocytosis but 10% bands, x ray s/o consolidation, USG with dilation of intra hepatic ducts similar to records from [place].   Lipase level is 116.5. Blood cx and urine cx are negative.   Currently on Vancomycin 750 q12 and Zosyn 3.375 q8. RVP positive for flu- Tamiflu      Influenza pneumonia - low suspicion for bacterial pneumonia   recent Klebsiella bacteremia on antibiotics    Suggest:  *d/c Vancomycin   *d/c zosyn 3.375 q8   *c/w Tamiflu 30 BID   *Start Ceftriaxone - will cover for klebsiella bacteremia as well as streptococcal pneumonia       d/w team

## 2019-11-02 NOTE — PROGRESS NOTE ADULT - PROBLEM SELECTOR PLAN 4
- A1c  10.5   - Patient hypoglycemic to 60 in ED and was started on Lantus 30U qHS by PCP a few days ago. She was previously on Metformin Glipizide and Sitapliptin, which patient states were just recently stopped    - Will hold all long acting insulin due to hypoglycemic event and monitor FS qAC and qHS on low dose ISS   will start lower dose of lantus, will request Endo eval

## 2019-11-02 NOTE — CONSULT NOTE ADULT - SUBJECTIVE AND OBJECTIVE BOX
Patient is a 79y old  Female who presents with a chief complaint of shortness of breath (2019 18:19)    HPI:  79F hx of T2DM, HTN, hypothyroidism and recent hospitalization for septic shock 2/2 klebsiella bacteremia due to cholangitis (in [place]) presenting with one day of shortness of breath. Per patient and medical record review, patient was visiting family in [facility] and while there she developed cough and RUQ pain. During flight back to [facility], patient developed confusion and pre-syncopal event and the flight was diverted to [facility]. She was taken to hospital where she was admitted from 10/24-10/29/19. Per review of records, CTPA was negative for PE. She initially required pressor support and was admitted to the ICU for septic shock 2/2 Klebsiella pneumoniae due to presumed ascending cholangitis. Abs US showed dilated GB w/ dilation of intra-hepatic ducts. She was discharged on Cipro 500mg BID to be completed on .     Patient returned to [facility] on 10/29 and saw her PCP the next day. She was taken off her diabetic medications and started on Lantus 30U qHS. She states that she was feeling well until this AM, where she developed shortness of breath. She also endorsing alternating chills and fevers, weakness and productive cough. She states her abdomen has been distended since hospitalization in [place] and endorses feeling "bloated", which improves when she belches. She denies dysuria or increased frequency. (2019 18:19)      prior hospital charts reviewed [x  ]  primary team notes reviewed [ x ]  other consultant notes reviewed [x  ]    PAST MEDICAL & SURGICAL HISTORY:  GERD (gastroesophageal reflux disease)  HLD (hyperlipidemia)  Sepsis due to Klebsiella  Hypothyroidism  Hypertension  Type 2 diabetes mellitus  No significant past surgical history    Allergies  No Known Drug Allergies  Seafood (Rash)    ANTIMICROBIALS:    oseltamivir 30 two times a day  piperacillin/tazobactam IVPB.. 3.375 every 8 hours  vancomycin  IVPB 750 every 12 hours      OTHER MEDS: MEDICATIONS  (STANDING):  albuterol/ipratropium for Nebulization 3 every 6 hours  amLODIPine   Tablet 5 daily  dextrose 40% Gel 15 once PRN  dextrose 50% Injectable 12.5 once  dextrose 50% Injectable 25 once  dextrose 50% Injectable 25 once  enoxaparin Injectable 40 daily  glucagon  Injectable 1 once PRN  guaiFENesin    Syrup 100 every 6 hours PRN  insulin lispro (HumaLOG) corrective regimen sliding scale  three times a day before meals  insulin lispro (HumaLOG) corrective regimen sliding scale  at bedtime  levothyroxine 88 daily      SOCIAL HISTORY:   hx smoking  non-smoker    FAMILY HISTORY:  No pertinent family history in first degree relatives      REVIEW OF SYSTEMS  [  ] ROS unobtainable because:    [  ] All other systems negative except as noted below:	    Constitutional:  [ ] fever [ ] chills  [ ] weight loss  [ ] weakness  Skin:  [ ] rash [ ] phlebitis	  Eyes: [ ] icterus [ ] pain  [ ] discharge	  ENMT: [ ] sore throat  [ ] thrush [ ] ulcers [ ] exudates  Respiratory: [ ] dyspnea [ ] hemoptysis [ ] cough [ ] sputum	  Cardiovascular:  [ ] chest pain [ ] palpitations [ ] edema	  Gastrointestinal:  [ ] nausea [ ] vomiting [ ] diarrhea [ ] constipation [ ] pain	  Genitourinary:  [ ] dysuria [ ] frequency [ ] hematuria [ ] discharge [ ] flank pain  [ ] incontinence  Musculoskeletal:  [ ] myalgias [ ] arthralgias [ ] arthritis  [ ] back pain  Neurological:  [ ] headache [ ] seizures  [ ] confusion/altered mental status  Psychiatric:  [ ] anxiety [ ] depression	  Hematology/Lymphatics:  [ ] lymphadenopathy  Endocrine:  [ ] adrenal [ ] thyroid  Allergic/Immunologic:	 [ ] transplant [ ] seasonal    Vital Signs Last 24 Hrs  T(F): 97.7 (19 @ 14:28), Max: 98.8 (19 @ 09:30)    Vital Signs Last 24 Hrs  HR: 86 (19 @ 14:28) (75 - 88)  BP: 134/58 (19 @ 14:28) (129/76 - 162/71)  RR: 19 (19 @ 14:28)  SpO2: 100% (19 @ 14:28) (97% - 100%)  Wt(kg): --    PHYSICAL EXAM:  General: non-toxic  HEAD/EYES: anicteric, PERRL  ENT:  supple  Cardiovascular:   S1, S2  Respiratory:  clear bilaterally  GI:  soft, non-tender, normal bowel sounds  :  no CVA tenderness   Musculoskeletal:  no synovitis  Neurologic:  grossly non-focal  Skin:  no rash  Lymph: no lymphadenopathy  Psychiatric:  appropriate affect  Vascular:  no phlebitis                          10.1   8.40  )-----------( 439      ( 2019 06:53 )             32.6     11    133<L>  |  96<L>  |  7   ----------------------------<  295<H>  3.7   |  24  |  0.89    Ca    9.0      2019 06:53  Phos  3.0       Mg     1.8         TPro  6.0  /  Alb  3.0<L>  /  TBili  0.7  /  DBili  x   /  AST  37<H>  /  ALT  33  /  AlkPhos  181<H>        Urinalysis Basic - ( 2019 10:00 )    Color: LIGHT YELLOW / Appearance: CLEAR / S.012 / pH: 7.0  Gluc: NEGATIVE / Ketone: TRACE  / Bili: NEGATIVE / Urobili: NORMAL   Blood: NEGATIVE / Protein: 20 / Nitrite: NEGATIVE   Leuk Esterase: NEGATIVE / RBC: 0-2 / WBC 0-2   Sq Epi: OCC / Non Sq Epi: x / Bacteria: NEGATIVE      MICROBIOLOGY:  Culture - Blood (collected 2019 13:20)  Source: Peripheral Site 2  Preliminary Report (2019 13:21):    NO ORGANISMS ISOLATED    NO ORGANISMS ISOLATED AT 24 HOURS    Culture - Blood (collected 2019 13:20)  Source: Peripheral Site 1  Preliminary Report (2019 13:21):    NO ORGANISMS ISOLATED    NO ORGANISMS ISOLATED AT 24 HOURS    Culture - Urine (collected 2019 11:28)  Source: URINE MIDSTREAM  Final Report (2019 08:24):    NO GROWTH AT 24 HOUR        RADIOLOGY:  < from: Xray Chest 2 Views PA/Lat (19 @ 13:27) >    IMPRESSION:  Hazy indistinct bilateral CP angle suggesting small pleural effusions.   Vague increased markings and streaky opacities in both lung bases   compatible with associated consolidative changes, infection/pneumonia in   the proper clinical context cannot be excluded.    Clear mid and upper lungs and no pneumothorax.    Aortic calcifications. Otherwise cardiac and mediastinal silhouettes   grossly within normal limits.    Trachea midline.    Generalized osteopenia.    < from: US Abdomen Limited (19 @ 12:18) >  IMPRESSION:     Left and right intrahepatic biliary ductal dilatation. Correlate with   contrast enhanced CT scan or MRI to evaluate for potential underlying   hilar mass.    Prominent distention of the gallbladder, without evidence of   cholelithiasis or acute cholecystitis. Patient is a 79y old  Female who presents with a chief complaint of shortness of breath (2019 18:19)    HPI:  79F hx of T2DM, HTN, hypothyroidism and recent hospitalization for septic shock 2/2 klebsiella bacteremia due to cholangitis (in [place]) presenting with one day of shortness of breath. Per patient and medical record review, patient was visiting family in [facility] and while there she developed cough and RUQ pain. During flight back to [place] to [facility], patient developed confusion and pre-syncopal event and the flight was diverted to [facility]. She was taken to hospital where she was admitted from 10/24-10/29/19. Per review of records, CTPA was negative for PE. She initially required pressor support and was admitted to the ICU for septic shock 2/2 Klebsiella pneumoniae due to presumed ascending cholangitis. Abs US showed dilated GB w/ dilation of intra-hepatic ducts. She was discharged on Cipro 500mg BID to be completed on .     Patient returned to [place] [facility] on 10/29 and saw her PCP the next day. She was taken off her diabetic medications and started on Lantus 30U qHS. She states that she was feeling well until this AM, where she developed shortness of breath. She also endorsing alternating chills and fevers, weakness and productive cough. She states her abdomen has been distended since hospitalization in [place] and endorses feeling "bloated", which improves when she belches. She denies dysuria or increased frequency. (2019 18:19)      prior hospital charts reviewed [x  ]  primary team notes reviewed [ x ]  other consultant notes reviewed [x  ]    PAST MEDICAL & SURGICAL HISTORY:  GERD (gastroesophageal reflux disease)  HLD (hyperlipidemia)  Sepsis due to Klebsiella  Hypothyroidism  Hypertension  Type 2 diabetes mellitus  No significant past surgical history    Allergies  No Known Drug Allergies  Seafood (Rash)    ANTIMICROBIALS:    oseltamivir 30 two times a day  piperacillin/tazobactam IVPB.. 3.375 every 8 hours  vancomycin  IVPB 750 every 12 hours      OTHER MEDS: MEDICATIONS  (STANDING):  albuterol/ipratropium for Nebulization 3 every 6 hours  amLODIPine   Tablet 5 daily  dextrose 40% Gel 15 once PRN  dextrose 50% Injectable 12.5 once  dextrose 50% Injectable 25 once  dextrose 50% Injectable 25 once  enoxaparin Injectable 40 daily  glucagon  Injectable 1 once PRN  guaiFENesin    Syrup 100 every 6 hours PRN  insulin lispro (HumaLOG) corrective regimen sliding scale  three times a day before meals  insulin lispro (HumaLOG) corrective regimen sliding scale  at bedtime  levothyroxine 88 daily      SOCIAL HISTORY: non smoker, no alcohol use    FAMILY HISTORY:  Father, , age 83, old age       REVIEW OF SYSTEMS  [  ] ROS unobtainable because:    [ x ] All other systems negative except as noted below:	    Constitutional:  [ ] fever [ ] chills  [ ] weight loss  [ ] weakness  Skin:  [ ] rash [ ] phlebitis	  Eyes: [ ] icterus [ ] pain  [ ] discharge	  ENMT: [ ] sore throat  [ ] thrush [ ] ulcers [ ] exudates  Respiratory: [ x] dyspnea [ ] hemoptysis [ ] cough [ ] sputum	  Cardiovascular:  [ ] chest pain [ ] palpitations [ ] edema	  Gastrointestinal:  [ ] nausea [ ] vomiting [ ] diarrhea [ ] constipation [ ] pain	  Genitourinary:  [ ] dysuria [ ] frequency [ ] hematuria [ ] discharge [ ] flank pain  [ ] incontinence  Musculoskeletal:  [ ] myalgias [ ] arthralgias [ ] arthritis  [ ] back pain  Neurological:  [ ] headache [ ] seizures  [ ] confusion/altered mental status  Psychiatric:  [ ] anxiety [ ] depression	  Hematology/Lymphatics:  [ ] lymphadenopathy  Endocrine:  [ ] adrenal [ ] thyroid  Allergic/Immunologic:	 [ ] transplant [ ] seasonal    Vital Signs Last 24 Hrs  T(F): 97.7 (19 @ 14:28), Max: 98.8 (19 @ 09:30)    Vital Signs Last 24 Hrs  HR: 86 (19 @ 14:28) (75 - 88)  BP: 134/58 (19 @ 14:28) (129/76 - 162/71)  RR: 19 (19 @ 14:28)  SpO2: 100% (19 @ 14:28) (97% - 100%)  Wt(kg): --    PHYSICAL EXAM:  General: non-toxic  HEAD/EYES: anicteric, PERRL  ENT:  supple  Cardiovascular:   S1, S2  Respiratory:  clear bilaterally  GI:  soft, non-tender, normal bowel sounds  :  no CVA tenderness   Musculoskeletal:  no synovitis  Neurologic:  grossly non-focal  Skin:  no rash  Lymph: no lymphadenopathy  Psychiatric:  appropriate affect  Vascular:  no phlebitis                          10.1   8.40  )-----------( 439      ( 2019 06:53 )             32.6         133<L>  |  96<L>  |  7   ----------------------------<  295<H>  3.7   |  24  |  0.89    Ca    9.0      2019 06:53  Phos  3.0       Mg     1.8         TPro  6.0  /  Alb  3.0<L>  /  TBili  0.7  /  DBili  x   /  AST  37<H>  /  ALT  33  /  AlkPhos  181<H>        Urinalysis Basic - ( 2019 10:00 )    Color: LIGHT YELLOW / Appearance: CLEAR / S.012 / pH: 7.0  Gluc: NEGATIVE / Ketone: TRACE  / Bili: NEGATIVE / Urobili: NORMAL   Blood: NEGATIVE / Protein: 20 / Nitrite: NEGATIVE   Leuk Esterase: NEGATIVE / RBC: 0-2 / WBC 0-2   Sq Epi: OCC / Non Sq Epi: x / Bacteria: NEGATIVE      MICROBIOLOGY:  Culture - Blood (collected 2019 13:20)  Source: Peripheral Site 2  Preliminary Report (2019 13:21):    NO ORGANISMS ISOLATED    NO ORGANISMS ISOLATED AT 24 HOURS    Culture - Blood (collected 2019 13:20)  Source: Peripheral Site 1  Preliminary Report (2019 13:21):    NO ORGANISMS ISOLATED    NO ORGANISMS ISOLATED AT 24 HOURS    Culture - Urine (collected 2019 11:28)  Source: URINE MIDSTREAM  Final Report (2019 08:24):    NO GROWTH AT 24 HOUR        RADIOLOGY:  < from: Xray Chest 2 Views PA/Lat (19 @ 13:27) >    IMPRESSION:  Hazy indistinct bilateral CP angle suggesting small pleural effusions.   Vague increased markings and streaky opacities in both lung bases   compatible with associated consolidative changes, infection/pneumonia in   the proper clinical context cannot be excluded.    Clear mid and upper lungs and no pneumothorax.    Aortic calcifications. Otherwise cardiac and mediastinal silhouettes   grossly within normal limits.    Trachea midline.    Generalized osteopenia.    < from: US Abdomen Limited (19 @ 12:18) >  IMPRESSION:     Left and right intrahepatic biliary ductal dilatation. Correlate with   contrast enhanced CT scan or MRI to evaluate for potential underlying   hilar mass.    Prominent distention of the gallbladder, without evidence of   cholelithiasis or acute cholecystitis.

## 2019-11-02 NOTE — PROGRESS NOTE ADULT - SUBJECTIVE AND OBJECTIVE BOX
Patient is a 79y old  Female who presents with a chief complaint of shortness of breath (2019 18:19)      SUBJECTIVE / OVERNIGHT EVENTS: patient seen and examined by bedside, feeling much better now , denies headache, dizziness, SOB, CP, Palpitations , N/V/D, abdominal pain        MEDICATIONS  (STANDING):  albuterol/ipratropium for Nebulization 3 milliLiter(s) Nebulizer every 6 hours  amLODIPine   Tablet 5 milliGRAM(s) Oral daily  dextrose 5%. 1000 milliLiter(s) (50 mL/Hr) IV Continuous <Continuous>  dextrose 50% Injectable 12.5 Gram(s) IV Push once  dextrose 50% Injectable 25 Gram(s) IV Push once  dextrose 50% Injectable 25 Gram(s) IV Push once  enoxaparin Injectable 40 milliGRAM(s) SubCutaneous daily  insulin lispro (HumaLOG) corrective regimen sliding scale   SubCutaneous three times a day before meals  insulin lispro (HumaLOG) corrective regimen sliding scale   SubCutaneous at bedtime  levothyroxine 88 MICROGram(s) Oral daily  oseltamivir 30 milliGRAM(s) Oral two times a day  piperacillin/tazobactam IVPB.. 3.375 Gram(s) IV Intermittent every 8 hours  vancomycin  IVPB 750 milliGRAM(s) IV Intermittent every 12 hours    MEDICATIONS  (PRN):  dextrose 40% Gel 15 Gram(s) Oral once PRN Blood Glucose LESS THAN 70 milliGRAM(s)/deciliter  glucagon  Injectable 1 milliGRAM(s) IntraMuscular once PRN Glucose LESS THAN 70 milligrams/deciliter  guaiFENesin    Syrup 100 milliGRAM(s) Oral every 6 hours PRN Cough      Vital Signs Last 24 Hrs  T(C): 36.5 (2019 14:28), Max: 37.1 (2019 09:30)  T(F): 97.7 (2019 14:28), Max: 98.8 (2019 09:30)  HR: 86 (2019 14:28) (75 - 88)  BP: 134/58 (2019 14:28) (129/76 - 162/71)  BP(mean): --  RR: 19 (2019 14:28) (16 - 20)  SpO2: 100% (2019 14:28) (97% - 100%)  CAPILLARY BLOOD GLUCOSE      POCT Blood Glucose.: 264 mg/dL (2019 11:51)  POCT Blood Glucose.: 278 mg/dL (2019 08:28)  POCT Blood Glucose.: 215 mg/dL (2019 22:10)    I&O's Summary      PHYSICAL EXAM:  GENERAL: NAD, well-developed  HEAD:  Atraumatic, Normocephalic  EYES: EOMI, PERRLA, conjunctiva and sclera clear  NECK: Supple, No JVD  CHEST/LUNG: Clear to auscultation bilaterally; No wheeze  HEART: Regular rate and rhythm; No murmurs, rubs, or gallops  ABDOMEN: Soft, Nontender, mildly distended; Bowel sounds present  EXTREMITIES:  2+ Peripheral Pulses, No clubbing, cyanosis, or edema  PSYCH: AAOx3  NEUROLOGY: non-focal  SKIN: No rashes or lesions    LABS:                        10.1   8.40  )-----------( 439      ( 2019 06:53 )             32.6     11-    133<L>  |  96<L>  |  7   ----------------------------<  295<H>  3.7   |  24  |  0.89    Ca    9.0      2019 06:53  Phos  3.0     11  Mg     1.8         TPro  6.0  /  Alb  3.0<L>  /  TBili  0.7  /  DBili  x   /  AST  37<H>  /  ALT  33  /  AlkPhos  181<H>  1102    PT/INR - ( 2019 10:10 )   PT: 9.5 SEC;   INR: 0.84          PTT - ( 2019 10:10 )  PTT:30.4 SEC      Urinalysis Basic - ( 2019 10:00 )    Color: LIGHT YELLOW / Appearance: CLEAR / S.012 / pH: 7.0  Gluc: NEGATIVE / Ketone: TRACE  / Bili: NEGATIVE / Urobili: NORMAL   Blood: NEGATIVE / Protein: 20 / Nitrite: NEGATIVE   Leuk Esterase: NEGATIVE / RBC: 0-2 / WBC 0-2   Sq Epi: OCC / Non Sq Epi: x / Bacteria: NEGATIVE        RADIOLOGY & ADDITIONAL TESTS:    Imaging Personally Reviewed:  < from: US Abdomen Limited (19 @ 12:18) >  Liver: Within normal limits.    Bile ducts: Intrahepatic biliary ductal dilatation is present in the left   and right hepatic lobes. The CBD is within normal limits in caliber   measuring 6 mm.     Gallbladder: Probably distended. No stones, wall thickening, or localized   tenderness.        Pancreas: Visualized portions are within normal limits.    Right kidney: 8.4 cm. No hydronephrosis.     Ascites: None.    IVC: Visualized portions are within normal limits.    IMPRESSION:     Left and right intrahepatic biliary ductal dilatation. Correlate with   contrast enhanced CT scan or MRI to evaluate for potential underlying   hilar mass.    Prominent distention of the gallbladder, without evidence of   cholelithiasis or acute cholecystitis.        < end of copied text >  < from: Xray Chest 2 Views PA/Lat (19 @ 13:27) >  Hazy indistinct bilateral CP angle suggesting small pleural effusions.   Vague increased markings and streaky opacities in both lung bases   compatible with associated consolidative changes, infection/pneumonia in   the proper clinical context cannot be excluded.    Clear mid and upper lungs and no pneumothorax.    Aortic calcifications. Otherwise cardiac and mediastinal silhouettes   grossly within normal limits.    Trachea midline.    Generalized osteopenia.      < end of copied text >    Consultant(s) Notes Reviewed:      Care Discussed with Consultants/Other Providers:

## 2019-11-02 NOTE — CONSULT NOTE ADULT - ATTENDING COMMENTS
Flu, recent klebsiella bacteremia/cholangitis  -doubt bacterial pna  -tamiflu 30 mg po bid x 5 days total  -got flu shot already   -ceftriaxone 1 gm iv q24 for klebsiella/cholangitis issue  -dc vanco/zosyn   -f/u pending cultures    Peterson Baird  Attending Physician   Division of Infectious Disease  Pager #[phone]  After 5pm/weekend or no response, call #[phone]

## 2019-11-03 LAB
ALBUMIN SERPL ELPH-MCNC: 3.2 G/DL — LOW (ref 3.3–5)
ALP SERPL-CCNC: 202 U/L — HIGH (ref 40–120)
ALT FLD-CCNC: 26 U/L — SIGNIFICANT CHANGE UP (ref 4–33)
ANION GAP SERPL CALC-SCNC: 10 MMO/L — SIGNIFICANT CHANGE UP (ref 7–14)
AST SERPL-CCNC: 29 U/L — SIGNIFICANT CHANGE UP (ref 4–32)
BASOPHILS # BLD AUTO: 0.08 K/UL — SIGNIFICANT CHANGE UP (ref 0–0.2)
BASOPHILS NFR BLD AUTO: 0.9 % — SIGNIFICANT CHANGE UP (ref 0–2)
BILIRUB SERPL-MCNC: 0.6 MG/DL — SIGNIFICANT CHANGE UP (ref 0.2–1.2)
BUN SERPL-MCNC: 8 MG/DL — SIGNIFICANT CHANGE UP (ref 7–23)
CALCIUM SERPL-MCNC: 9.2 MG/DL — SIGNIFICANT CHANGE UP (ref 8.4–10.5)
CHLORIDE SERPL-SCNC: 96 MMOL/L — LOW (ref 98–107)
CO2 SERPL-SCNC: 26 MMOL/L — SIGNIFICANT CHANGE UP (ref 22–31)
CREAT SERPL-MCNC: 1.01 MG/DL — SIGNIFICANT CHANGE UP (ref 0.5–1.3)
EOSINOPHIL # BLD AUTO: 0.26 K/UL — SIGNIFICANT CHANGE UP (ref 0–0.5)
EOSINOPHIL NFR BLD AUTO: 2.8 % — SIGNIFICANT CHANGE UP (ref 0–6)
GLUCOSE BLDC GLUCOMTR-MCNC: 239 MG/DL — HIGH (ref 70–99)
GLUCOSE BLDC GLUCOMTR-MCNC: 278 MG/DL — HIGH (ref 70–99)
GLUCOSE BLDC GLUCOMTR-MCNC: 288 MG/DL — HIGH (ref 70–99)
GLUCOSE BLDC GLUCOMTR-MCNC: 296 MG/DL — HIGH (ref 70–99)
GLUCOSE SERPL-MCNC: 205 MG/DL — HIGH (ref 70–99)
HCT VFR BLD CALC: 32.9 % — LOW (ref 34.5–45)
HGB BLD-MCNC: 10.1 G/DL — LOW (ref 11.5–15.5)
IMM GRANULOCYTES NFR BLD AUTO: 3.5 % — HIGH (ref 0–1.5)
LYMPHOCYTES # BLD AUTO: 1.66 K/UL — SIGNIFICANT CHANGE UP (ref 1–3.3)
LYMPHOCYTES # BLD AUTO: 17.7 % — SIGNIFICANT CHANGE UP (ref 13–44)
MAGNESIUM SERPL-MCNC: 1.9 MG/DL — SIGNIFICANT CHANGE UP (ref 1.6–2.6)
MCHC RBC-ENTMCNC: 25.6 PG — LOW (ref 27–34)
MCHC RBC-ENTMCNC: 30.7 % — LOW (ref 32–36)
MCV RBC AUTO: 83.3 FL — SIGNIFICANT CHANGE UP (ref 80–100)
MONOCYTES # BLD AUTO: 1.01 K/UL — HIGH (ref 0–0.9)
MONOCYTES NFR BLD AUTO: 10.8 % — SIGNIFICANT CHANGE UP (ref 2–14)
NEUTROPHILS # BLD AUTO: 6.05 K/UL — SIGNIFICANT CHANGE UP (ref 1.8–7.4)
NEUTROPHILS NFR BLD AUTO: 64.3 % — SIGNIFICANT CHANGE UP (ref 43–77)
NRBC # FLD: 0 K/UL — SIGNIFICANT CHANGE UP (ref 0–0)
PHOSPHATE SERPL-MCNC: 3.3 MG/DL — SIGNIFICANT CHANGE UP (ref 2.5–4.5)
PLATELET # BLD AUTO: 474 K/UL — HIGH (ref 150–400)
PMV BLD: 9.6 FL — SIGNIFICANT CHANGE UP (ref 7–13)
POTASSIUM SERPL-MCNC: 3.8 MMOL/L — SIGNIFICANT CHANGE UP (ref 3.5–5.3)
POTASSIUM SERPL-SCNC: 3.8 MMOL/L — SIGNIFICANT CHANGE UP (ref 3.5–5.3)
PROT SERPL-MCNC: 6.4 G/DL — SIGNIFICANT CHANGE UP (ref 6–8.3)
RBC # BLD: 3.95 M/UL — SIGNIFICANT CHANGE UP (ref 3.8–5.2)
RBC # FLD: 15.6 % — HIGH (ref 10.3–14.5)
SODIUM SERPL-SCNC: 132 MMOL/L — LOW (ref 135–145)
WBC # BLD: 9.39 K/UL — SIGNIFICANT CHANGE UP (ref 3.8–10.5)
WBC # FLD AUTO: 9.39 K/UL — SIGNIFICANT CHANGE UP (ref 3.8–10.5)

## 2019-11-03 PROCEDURE — 99233 SBSQ HOSP IP/OBS HIGH 50: CPT

## 2019-11-03 PROCEDURE — 99232 SBSQ HOSP IP/OBS MODERATE 35: CPT | Mod: GC

## 2019-11-03 PROCEDURE — 99222 1ST HOSP IP/OBS MODERATE 55: CPT | Mod: GC

## 2019-11-03 RX ADMIN — CEFTRIAXONE 100 MILLIGRAM(S): 500 INJECTION, POWDER, FOR SOLUTION INTRAMUSCULAR; INTRAVENOUS at 22:47

## 2019-11-03 RX ADMIN — Medication 30 MILLIGRAM(S): at 05:54

## 2019-11-03 RX ADMIN — Medication 3 MILLILITER(S): at 16:56

## 2019-11-03 RX ADMIN — Medication 30 MILLIGRAM(S): at 17:18

## 2019-11-03 RX ADMIN — Medication 1: at 22:47

## 2019-11-03 RX ADMIN — AMLODIPINE BESYLATE 5 MILLIGRAM(S): 2.5 TABLET ORAL at 05:54

## 2019-11-03 RX ADMIN — ENOXAPARIN SODIUM 40 MILLIGRAM(S): 100 INJECTION SUBCUTANEOUS at 12:38

## 2019-11-03 RX ADMIN — Medication 2: at 12:38

## 2019-11-03 RX ADMIN — Medication 3: at 17:18

## 2019-11-03 RX ADMIN — INSULIN GLARGINE 5 UNIT(S): 100 INJECTION, SOLUTION SUBCUTANEOUS at 22:47

## 2019-11-03 RX ADMIN — Medication 100 MICROGRAM(S): at 05:54

## 2019-11-03 RX ADMIN — Medication 3: at 08:34

## 2019-11-03 RX ADMIN — Medication 3 MILLILITER(S): at 11:09

## 2019-11-03 NOTE — PROGRESS NOTE ADULT - PROBLEM SELECTOR PLAN 8
1.  Name of PCP: Dr. Ventura ([place])   2.  PCP Contacted on Admission: [ ] Y    [ ] N    3.  PCP contacted at Discharge: [ ] Y    [ ] N    [ ] N/A  4.  Post-Discharge Appointment Date and Location:  5.  Summary of Handoff given to PCP:

## 2019-11-03 NOTE — CONSULT NOTE ADULT - SUBJECTIVE AND OBJECTIVE BOX
Chief Complaint:  Patient is a 79y old  Female who presents with a chief complaint of shortness of breath (2019 15:02)      HPI:    79F hx of T2DM, HTN, hypothyroidism and recent hospitalization for septic shock 2/2 klebsiella bacteremia due to cholangitis (in [place]) presenting with one day of shortness of breath. Pt was visiting family in the [facility] where she developed cough and RUQ pain. During flight back to [facility], patient developed confusion and pre-syncopal event and the flight was diverted to [facility]. She was taken to hospital where she was admitted from 10/24-10/29/19. Per H&P and review of records, pt was admitted to the ICU for septic shock 2/2 Klebsiella pneumoniae due to presumed ascending cholangitis. Abdominal US there showed dilated GB w/ dilation of intra-hepatic ducts. She was discharged on Cipro 500mg BID to be completed on . Pt returned home and was doing fine until this AM when she developed shortness of breath, chills, fever, weakness and cough.     Labs here are as follow: WBC 9 Tbili 0.7  AST/ALT 37/33, blood cultures neg x2, urine culture negative. US here showed intrahepatic biliary ductal dilatation present in the left and right hepatic lobes, CBD is within normal limits in caliber measuring 6 mm. Pt was also found to have influenza.     Allergies:  No Known Drug Allergies  Seafood (Rash)      Home Medications:    Hospital Medications:  albuterol/ipratropium for Nebulization 3 milliLiter(s) Nebulizer every 6 hours  amLODIPine   Tablet 5 milliGRAM(s) Oral daily  cefTRIAXone   IVPB 1000 milliGRAM(s) IV Intermittent every 24 hours  dextrose 40% Gel 15 Gram(s) Oral once PRN  dextrose 5%. 1000 milliLiter(s) IV Continuous <Continuous>  dextrose 50% Injectable 12.5 Gram(s) IV Push once  dextrose 50% Injectable 25 Gram(s) IV Push once  dextrose 50% Injectable 25 Gram(s) IV Push once  enoxaparin Injectable 40 milliGRAM(s) SubCutaneous daily  glucagon  Injectable 1 milliGRAM(s) IntraMuscular once PRN  guaiFENesin    Syrup 100 milliGRAM(s) Oral every 6 hours PRN  insulin glargine Injectable (LANTUS) 5 Unit(s) SubCutaneous at bedtime  insulin lispro (HumaLOG) corrective regimen sliding scale   SubCutaneous three times a day before meals  insulin lispro (HumaLOG) corrective regimen sliding scale   SubCutaneous at bedtime  levothyroxine 100 MICROGram(s) Oral daily  melatonin 3 milliGRAM(s) Oral at bedtime PRN  oseltamivir 30 milliGRAM(s) Oral two times a day      PMHX/PSHX:  GERD (gastroesophageal reflux disease)  HLD (hyperlipidemia)  Sepsis due to Klebsiella  Hypothyroidism  Hypertension  Type 2 diabetes mellitus  No significant past surgical history      Family history:  No pertinent family history in first degree relatives      There is no family history of peptic ulcer disease, gastric cancer, colon polyps, colon cancer, celiac disease, biliary, hepatic, or pancreatic disease.  None of the female relatives have breast, uterine, or ovarian cancer.     Social History:     ROS:     General:  No wt loss, fevers, chills, night sweats, fatigue,   Eyes:  Good vision, no reported pain  ENT:  No sore throat, pain, runny nose, dysphagia  CV:  No pain, palpitations, hypo/hypertension  Resp:  No dyspnea, cough, tachypnea, wheezing  GI:  See HPI   :  No pain, bleeding, incontinence, nocturia  Muscle:  No pain, weakness  Neuro:  No weakness, tingling, memory problems  Psych:  No fatigue, insomnia, mood problems, depression  Endocrine:  No polyuria, polydipsia, cold/heat intolerance  Heme:  No petechiae, ecchymosis, easy bruisability  Skin:  No rash, tattoos, scars, edema      PHYSICAL EXAM:     GENERAL:  Appears stated age, well-groomed  HEENT:  NC/AT,  conjunctivae clear and pink, no thyromegaly, nodules, adenopathy, no JVD, sclera -anicteric  CHEST:  Full & symmetric excursion, no increased effort, breath sounds clear  HEART:  Regular rhythm, S1, S2, no murmur/rub/S3/S4, no abdominal bruit, no edema  ABDOMEN:  Soft, non-tender, non-distended, normoactive bowel sounds  EXTEREMITIES:  no cyanosis,clubbing or edema  SKIN:  No rash/erythema/ecchymoses  NEURO:  Alert, oriented, no asterixis    Vital Signs:  Vital Signs Last 24 Hrs  T(C): 36.9 (2019 05:45), Max: 37.1 (2019 09:30)  T(F): 98.5 (2019 05:45), Max: 98.8 (2019 09:30)  HR: 86 (2019 05:45) (74 - 86)  BP: 162/76 (2019 05:45) (134/58 - 162/76)  BP(mean): --  RR: 18 (2019 05:45) (16 - 19)  SpO2: 98% (2019 05:45) (97% - 100%)  Daily     Daily     LABS:                        10.1   9.39  )-----------( 474      ( 2019 06:16 )             32.9     Mean Cell Volume: 83.3 fL (19 @ 06:16)        133<L>  |  96<L>  |  7   ----------------------------<  295<H>  3.7   |  24  |  0.89    Ca    9.0      2019 06:53  Phos  3.0       Mg     1.8         TPro  6.0  /  Alb  3.0<L>  /  TBili  0.7  /  DBili  x   /  AST  37<H>  /  ALT  33  /  AlkPhos  181<H>      LIVER FUNCTIONS - ( 2019 06:53 )  Alb: 3.0 g/dL / Pro: 6.0 g/dL / ALK PHOS: 181 u/L / ALT: 33 u/L / AST: 37 u/L / GGT: x           PT/INR - ( 2019 10:10 )   PT: 9.5 SEC;   INR: 0.84          PTT - ( 2019 10:10 )  PTT:30.4 SEC  Urinalysis Basic - ( 2019 10:00 )    Color: LIGHT YELLOW / Appearance: CLEAR / S.012 / pH: 7.0  Gluc: NEGATIVE / Ketone: TRACE  / Bili: NEGATIVE / Urobili: NORMAL   Blood: NEGATIVE / Protein: 20 / Nitrite: NEGATIVE   Leuk Esterase: NEGATIVE / RBC: 0-2 / WBC 0-2   Sq Epi: OCC / Non Sq Epi: x / Bacteria: NEGATIVE                              10.1   9.39  )-----------( 474      ( 2019 06:16 )             32.9                         10.1   8.40  )-----------( 439      ( 2019 06:53 )             32.6                         11.3   8.96  )-----------( 392      ( 2019 10:10 )             34.6     Imaging:  < from: US Abdomen Limited (19 @ 12:18) >  FINDINGS:    Liver: Within normal limits.    Bile ducts: Intrahepatic biliary ductal dilatation is present in the left   and right hepatic lobes. The CBD is within normal limits in caliber   measuring 6 mm.     Gallbladder: Probably distended. No stones, wall thickening, or localized   tenderness.        Pancreas: Visualized portions are within normal limits.    Right kidney: 8.4 cm. No hydronephrosis.     Ascites: None.    IVC: Visualized portions are within normal limits.    IMPRESSION:     Left and right intrahepatic biliary ductal dilatation. Correlate with   contrast enhanced CT scan or MRI to evaluate for potential underlying   hilar mass.    Prominent distention of the gallbladder, without evidence of   cholelithiasis or acute cholecystitis.        < end of copied text > Chief Complaint:  Patient is a 79y old  Female who presents with a chief complaint of shortness of breath (2019 15:02)      HPI:    79F hx of T2DM, HTN, hypothyroidism and recent hospitalization for septic shock 2/2 klebsiella bacteremia due to cholangitis (in [place]) presenting with one day of shortness of breath. Pt was visiting family in the [facility] where she developed cough and RUQ pain. During flight back to [facility], patient developed confusion and pre-syncopal event and the flight was diverted to [facility]. She was taken to hospital where she was admitted from 10/24-10/29/19. Per H&P and review of records, pt was admitted to the ICU for septic shock 2/2 Klebsiella pneumoniae due to presumed ascending cholangitis. Abdominal imaging there showed dilated GB w/ dilation of intra-hepatic ducts. Blood work at that time revealed a WBC of 33, Tbili of 3.7, ALP pf 155,  . Pt subsequently had a repeat US that reportedly showed resolution of the dilation hence ERCP was not performed. She was discharged on Cipro 500mg BID to be completed on . Pt returned home and was doing fine until this AM when she developed shortness of breath, chills, fever, weakness and cough. Pt currently denies nausea, vomiting, abdominal pain, melena, hematochezia, change in bowel habits.     Labs here are as follow: WBC 9 Tbili 0.7  AST/ALT 37/33, blood cultures neg x2, urine culture negative. US here showed intrahepatic biliary ductal dilatation present in the left and right hepatic lobes, CBD is within normal limits in caliber measuring 6 mm. Pt was also found to have influenza.     Allergies:  No Known Drug Allergies  Seafood (Rash)      Home Medications:    Hospital Medications:  albuterol/ipratropium for Nebulization 3 milliLiter(s) Nebulizer every 6 hours  amLODIPine   Tablet 5 milliGRAM(s) Oral daily  cefTRIAXone   IVPB 1000 milliGRAM(s) IV Intermittent every 24 hours  dextrose 40% Gel 15 Gram(s) Oral once PRN  dextrose 5%. 1000 milliLiter(s) IV Continuous <Continuous>  dextrose 50% Injectable 12.5 Gram(s) IV Push once  dextrose 50% Injectable 25 Gram(s) IV Push once  dextrose 50% Injectable 25 Gram(s) IV Push once  enoxaparin Injectable 40 milliGRAM(s) SubCutaneous daily  glucagon  Injectable 1 milliGRAM(s) IntraMuscular once PRN  guaiFENesin    Syrup 100 milliGRAM(s) Oral every 6 hours PRN  insulin glargine Injectable (LANTUS) 5 Unit(s) SubCutaneous at bedtime  insulin lispro (HumaLOG) corrective regimen sliding scale   SubCutaneous three times a day before meals  insulin lispro (HumaLOG) corrective regimen sliding scale   SubCutaneous at bedtime  levothyroxine 100 MICROGram(s) Oral daily  melatonin 3 milliGRAM(s) Oral at bedtime PRN  oseltamivir 30 milliGRAM(s) Oral two times a day      PMHX/PSHX:  GERD (gastroesophageal reflux disease)  HLD (hyperlipidemia)  Sepsis due to Klebsiella  Hypothyroidism  Hypertension  Type 2 diabetes mellitus  No significant past surgical history      Family history:  No pertinent family history in first degree relatives      There is no family history of peptic ulcer disease, gastric cancer, colon polyps, colon cancer, celiac disease, biliary, hepatic, or pancreatic disease.  None of the female relatives have breast, uterine, or ovarian cancer.     Social History:     ROS:     General:  No wt loss, fevers, chills, night sweats, fatigue,   Eyes:  Good vision, no reported pain  ENT:  No sore throat, pain, runny nose, dysphagia  CV:  No pain, palpitations, hypo/hypertension  Resp:  No dyspnea, cough, tachypnea, wheezing  GI:  See HPI   :  No pain, bleeding, incontinence, nocturia  Muscle:  No pain, weakness  Neuro:  No weakness, tingling, memory problems  Psych:  No fatigue, insomnia, mood problems, depression  Endocrine:  No polyuria, polydipsia, cold/heat intolerance  Heme:  No petechiae, ecchymosis, easy bruisability  Skin:  No rash, tattoos, scars, edema      PHYSICAL EXAM:     GENERAL:  Appears stated age, well-groomed  HEENT:  NC/AT,  conjunctivae clear and pink, no thyromegaly, nodules, adenopathy, no JVD, sclera -anicteric  CHEST:  Full & symmetric excursion, no increased effort, breath sounds clear  HEART:  Regular rhythm, S1, S2, no murmur/rub/S3/S4, no abdominal bruit, no edema  ABDOMEN:  Soft, non-tender, non-distended, normoactive bowel sounds  EXTEREMITIES:  no cyanosis,clubbing or edema  SKIN:  No rash/erythema/ecchymoses  NEURO:  Alert, oriented, no asterixis    Vital Signs:  Vital Signs Last 24 Hrs  T(C): 36.9 (2019 05:45), Max: 37.1 (2019 09:30)  T(F): 98.5 (2019 05:45), Max: 98.8 (2019 09:30)  HR: 86 (2019 05:45) (74 - 86)  BP: 162/76 (2019 05:45) (134/58 - 162/76)  BP(mean): --  RR: 18 (2019 05:45) (16 - 19)  SpO2: 98% (2019 05:45) (97% - 100%)  Daily     Daily     LABS:                        10.1   9.39  )-----------( 474      ( 2019 06:16 )             32.9     Mean Cell Volume: 83.3 fL (-19 @ 06:16)        133<L>  |  96<L>  |  7   ----------------------------<  295<H>  3.7   |  24  |  0.89    Ca    9.0      2019 06:53  Phos  3.0       Mg     1.8         TPro  6.0  /  Alb  3.0<L>  /  TBili  0.7  /  DBili  x   /  AST  37<H>  /  ALT  33  /  AlkPhos  181<H>      LIVER FUNCTIONS - ( 2019 06:53 )  Alb: 3.0 g/dL / Pro: 6.0 g/dL / ALK PHOS: 181 u/L / ALT: 33 u/L / AST: 37 u/L / GGT: x           PT/INR - ( 2019 10:10 )   PT: 9.5 SEC;   INR: 0.84          PTT - ( 2019 10:10 )  PTT:30.4 SEC  Urinalysis Basic - ( 2019 10:00 )    Color: LIGHT YELLOW / Appearance: CLEAR / S.012 / pH: 7.0  Gluc: NEGATIVE / Ketone: TRACE  / Bili: NEGATIVE / Urobili: NORMAL   Blood: NEGATIVE / Protein: 20 / Nitrite: NEGATIVE   Leuk Esterase: NEGATIVE / RBC: 0-2 / WBC 0-2   Sq Epi: OCC / Non Sq Epi: x / Bacteria: NEGATIVE                              10.1   9.39  )-----------( 474      ( 2019 06:16 )             32.9                         10.1   8.40  )-----------( 439      ( 2019 06:53 )             32.6                         11.3   8.96  )-----------( 392      ( 2019 10:10 )             34.6     Imaging:  < from: US Abdomen Limited (19 @ 12:18) >  FINDINGS:    Liver: Within normal limits.    Bile ducts: Intrahepatic biliary ductal dilatation is present in the left   and right hepatic lobes. The CBD is within normal limits in caliber   measuring 6 mm.     Gallbladder: Probably distended. No stones, wall thickening, or localized   tenderness.        Pancreas: Visualized portions are within normal limits.    Right kidney: 8.4 cm. No hydronephrosis.     Ascites: None.    IVC: Visualized portions are within normal limits.    IMPRESSION:     Left and right intrahepatic biliary ductal dilatation. Correlate with   contrast enhanced CT scan or MRI to evaluate for potential underlying   hilar mass.    Prominent distention of the gallbladder, without evidence of   cholelithiasis or acute cholecystitis.        < end of copied text >

## 2019-11-03 NOTE — PROGRESS NOTE ADULT - SUBJECTIVE AND OBJECTIVE BOX
Patient is a 79y old  Female who presents with a chief complaint of shortness of breath (03 Nov 2019 10:14)      SUBJECTIVE / OVERNIGHT EVENTS: patient seen and examined by bedside, feeling better, fever, chills denies headache, dizziness, SOB, CP, Palpitations , N/V/D, abdominal pain        MEDICATIONS  (STANDING):  albuterol/ipratropium for Nebulization 3 milliLiter(s) Nebulizer every 6 hours  amLODIPine   Tablet 5 milliGRAM(s) Oral daily  cefTRIAXone   IVPB 1000 milliGRAM(s) IV Intermittent every 24 hours  dextrose 5%. 1000 milliLiter(s) (50 mL/Hr) IV Continuous <Continuous>  dextrose 50% Injectable 12.5 Gram(s) IV Push once  dextrose 50% Injectable 25 Gram(s) IV Push once  dextrose 50% Injectable 25 Gram(s) IV Push once  enoxaparin Injectable 40 milliGRAM(s) SubCutaneous daily  insulin glargine Injectable (LANTUS) 5 Unit(s) SubCutaneous at bedtime  insulin lispro (HumaLOG) corrective regimen sliding scale   SubCutaneous three times a day before meals  insulin lispro (HumaLOG) corrective regimen sliding scale   SubCutaneous at bedtime  levothyroxine 100 MICROGram(s) Oral daily  oseltamivir 30 milliGRAM(s) Oral two times a day    MEDICATIONS  (PRN):  dextrose 40% Gel 15 Gram(s) Oral once PRN Blood Glucose LESS THAN 70 milliGRAM(s)/deciliter  glucagon  Injectable 1 milliGRAM(s) IntraMuscular once PRN Glucose LESS THAN 70 milligrams/deciliter  guaiFENesin    Syrup 100 milliGRAM(s) Oral every 6 hours PRN Cough  melatonin 3 milliGRAM(s) Oral at bedtime PRN Insomnia      Vital Signs Last 24 Hrs  T(C): 37.3 (03 Nov 2019 13:45), Max: 37.3 (03 Nov 2019 13:45)  T(F): 99.1 (03 Nov 2019 13:45), Max: 99.1 (03 Nov 2019 13:45)  HR: 88 (03 Nov 2019 13:45) (74 - 88)  BP: 134/54 (03 Nov 2019 13:45) (126/63 - 162/76)  BP(mean): --  RR: 18 (03 Nov 2019 13:45) (16 - 18)  SpO2: 100% (03 Nov 2019 13:45) (98% - 100%)  CAPILLARY BLOOD GLUCOSE      POCT Blood Glucose.: 239 mg/dL (03 Nov 2019 11:57)  POCT Blood Glucose.: 296 mg/dL (03 Nov 2019 08:28)  POCT Blood Glucose.: 277 mg/dL (02 Nov 2019 21:45)  POCT Blood Glucose.: 282 mg/dL (02 Nov 2019 17:33)    I&O's Summary      PHYSICAL EXAM:  GENERAL: NAD, well-developed  HEAD:  Atraumatic, Normocephalic  EYES: EOMI, PERRLA, conjunctiva and sclera clear  NECK: Supple,   CHEST/LUNG: Clear to auscultation bilaterally; No wheeze  HEART: Regular rate and rhythm; No murmurs, rubs, or gallops  ABDOMEN: Soft, Nontender, Nondistended; Bowel sounds present  EXTREMITIES:  2+ Peripheral Pulses, No clubbing, cyanosis, or edema  PSYCH: AAOx3  NEUROLOGY: non-focal  SKIN: No rashes or lesions    LABS:                        10.1   9.39  )-----------( 474      ( 03 Nov 2019 06:16 )             32.9     11-03    132<L>  |  96<L>  |  8   ----------------------------<  205<H>  3.8   |  26  |  1.01    Ca    9.2      03 Nov 2019 06:16  Phos  3.3     11-03  Mg     1.9     11-03    TPro  6.4  /  Alb  3.2<L>  /  TBili  0.6  /  DBili  x   /  AST  29  /  ALT  26  /  AlkPhos  202<H>  11-03              RADIOLOGY & ADDITIONAL TESTS:    Imaging Personally Reviewed:    Consultant(s) Notes Reviewed:  ID, GI     Care Discussed with Consultants/Other Providers: ID

## 2019-11-03 NOTE — PROGRESS NOTE ADULT - PROBLEM SELECTOR PLAN 3
- Patient w/ recent admission for septic shock due to presumed ascending cholangitis at OSH   - ERCP was deferred at that time and patient had similar abd US findings of GB distention and intrahepatic biliary ductal dilation.   - Currently patient denies RUQ pain.   - Will hold off CT A/P as patient had recent imaging at OSH  - GI eval   appreciated, recommend MRCP    - Trend LFTs , trending down

## 2019-11-03 NOTE — PHYSICAL THERAPY INITIAL EVALUATION ADULT - PERTINENT HX OF CURRENT PROBLEM, REHAB EVAL
Patient is 79 year old female admitted with history of DM2, HTN, hypothyroidism, presents with shortness of breath.

## 2019-11-03 NOTE — PROGRESS NOTE ADULT - SUBJECTIVE AND OBJECTIVE BOX
Follow Up:  Patient is seen for follow up of influenza and h/o recent Klebsiella bacteremia     Interval History/ROS: Patient reports SOB has resolved. NO diarrhea, abdominal. Afebrile now     Allergies  No Known Drug Allergies  Seafood (Rash)    ANTIMICROBIALS:  cefTRIAXone   IVPB 1000 every 24 hours  oseltamivir 30 two times a day    OTHER MEDS:  MEDICATIONS  (STANDING):  albuterol/ipratropium for Nebulization 3 every 6 hours  amLODIPine   Tablet 5 daily  dextrose 40% Gel 15 once PRN  dextrose 50% Injectable 12.5 once  dextrose 50% Injectable 25 once  dextrose 50% Injectable 25 once  enoxaparin Injectable 40 daily  glucagon  Injectable 1 once PRN  guaiFENesin    Syrup 100 every 6 hours PRN  insulin glargine Injectable (LANTUS) 5 at bedtime  insulin lispro (HumaLOG) corrective regimen sliding scale  three times a day before meals  insulin lispro (HumaLOG) corrective regimen sliding scale  at bedtime  levothyroxine 100 daily  melatonin 3 at bedtime PRN      Vital Signs Last 24 Hrs  T(C): 36.9 (03 Nov 2019 05:45), Max: 36.9 (03 Nov 2019 05:45)  T(F): 98.5 (03 Nov 2019 05:45), Max: 98.5 (03 Nov 2019 05:45)  HR: 86 (03 Nov 2019 05:45) (74 - 86)  BP: 162/76 (03 Nov 2019 05:45) (134/58 - 162/76)  BP(mean): --  RR: 18 (03 Nov 2019 05:45) (16 - 19)  SpO2: 98% (03 Nov 2019 05:45) (98% - 100%)    PHYSICAL EXAM:  General: non-toxic  HEAD/EYES: anicteric, PERRL  ENT:  supple  Cardiovascular:   S1, S2  Respiratory:  clear bilaterally  GI:  soft, non-tender, normal bowel sounds  :  no CVA tenderness   Musculoskeletal:  no synovitis  Neurologic:  grossly non-focal  Skin:  no rash  Lymph: no lymphadenopathy  Psychiatric:  appropriate affect  Vascular:  no phlebitis                          10.1   9.39  )-----------( 474      ( 03 Nov 2019 06:16 )             32.9       11-03    132<L>  |  96<L>  |  8   ----------------------------<  205<H>  3.8   |  26  |  1.01    Ca    9.2      03 Nov 2019 06:16  Phos  3.3     11-03  Mg     1.9     11-03    TPro  6.4  /  Alb  3.2<L>  /  TBili  0.6  /  DBili  x   /  AST  29  /  ALT  26  /  AlkPhos  202<H>  11-03          MICROBIOLOGY:  v  Peripheral Site 1  11-01-19 --  --  --      URINE MIDSTREAM  11-01-19 --  --  --      RADIOLOGY:  < from: Xray Chest 2 Views PA/Lat (11.01.19 @ 13:27) >  IMPRESSION:  Hazy indistinct bilateral CP angle suggesting small pleural effusions.   Vague increased markings and streaky opacities in both lung bases   compatible with associated consolidative changes, infection/pneumonia in   the proper clinical context cannot be excluded.    Clear mid and upper lungs and no pneumothorax.    Aortic calcifications. Otherwise cardiac and mediastinal silhouettes   grossly within normal limits.    Trachea midline.    Generalized osteopenia.

## 2019-11-03 NOTE — CONSULT NOTE ADULT - ASSESSMENT
Impression:  1) Fever, chills- Pt with imaging findings of intrahepatic biliary ductal dilatation without elevated liver enzymes. Criteria not met for acute cholangitis based on [place] guidelines for acute cholangitis. Differential diagnosis of biliary dilatation includes choledocholithiasis, benign strictures   2) Influenza +    Recommendations:  -Check MRI/MRCP to further evaluate biliary tree  -Monitor temperature curve, CBC, CMP  -Rest of care per primary team Impression:  1) Fever, chills- Pt with imaging findings of intrahepatic biliary ductal dilatation without elevated liver enzymes. Criteria not met for acute cholangitis based on [place] guidelines for acute cholangitis. Differential diagnosis of biliary dilatation includes choledocholithiasis (that has passed), benign strictures   2) Influenza +    Recommendations:  -Check MRI/MRCP to further evaluate biliary tree  -Monitor temperature curve, CBC, CMP  -Rest of care per primary team

## 2019-11-03 NOTE — PROGRESS NOTE ADULT - PROBLEM SELECTOR PLAN 4
- A1c  10.5   - Patient hypoglycemic to 60 in ED and was started on Lantus 30U qHS by PCP a few days ago. She was previously on Metformin Glipizide and Sitapliptin, which patient states were just recently stopped    - Will hold all long acting insulin due to hypoglycemic event and monitor FS qAC and qHS on low dose ISS   will start lower dose of lantus,   will request Endo eval in am

## 2019-11-04 ENCOUNTER — TRANSCRIPTION ENCOUNTER (OUTPATIENT)
Age: 79
End: 2019-11-04

## 2019-11-04 VITALS
HEART RATE: 79 BPM | OXYGEN SATURATION: 97 % | RESPIRATION RATE: 18 BRPM | DIASTOLIC BLOOD PRESSURE: 58 MMHG | SYSTOLIC BLOOD PRESSURE: 126 MMHG | TEMPERATURE: 99 F

## 2019-11-04 DIAGNOSIS — J11.1 INFLUENZA DUE TO UNIDENTIFIED INFLUENZA VIRUS WITH OTHER RESPIRATORY MANIFESTATIONS: ICD-10-CM

## 2019-11-04 DIAGNOSIS — R78.81 BACTEREMIA: ICD-10-CM

## 2019-11-04 LAB
ALBUMIN SERPL ELPH-MCNC: 3.4 G/DL — SIGNIFICANT CHANGE UP (ref 3.3–5)
ALP SERPL-CCNC: 212 U/L — HIGH (ref 40–120)
ALT FLD-CCNC: 25 U/L — SIGNIFICANT CHANGE UP (ref 4–33)
ANION GAP SERPL CALC-SCNC: 12 MMO/L — SIGNIFICANT CHANGE UP (ref 7–14)
AST SERPL-CCNC: 30 U/L — SIGNIFICANT CHANGE UP (ref 4–32)
BASOPHILS # BLD AUTO: 0.1 K/UL — SIGNIFICANT CHANGE UP (ref 0–0.2)
BASOPHILS NFR BLD AUTO: 1.2 % — SIGNIFICANT CHANGE UP (ref 0–2)
BILIRUB SERPL-MCNC: 0.6 MG/DL — SIGNIFICANT CHANGE UP (ref 0.2–1.2)
BUN SERPL-MCNC: 12 MG/DL — SIGNIFICANT CHANGE UP (ref 7–23)
CALCIUM SERPL-MCNC: 9.5 MG/DL — SIGNIFICANT CHANGE UP (ref 8.4–10.5)
CHLORIDE SERPL-SCNC: 97 MMOL/L — LOW (ref 98–107)
CO2 SERPL-SCNC: 27 MMOL/L — SIGNIFICANT CHANGE UP (ref 22–31)
CREAT SERPL-MCNC: 1.05 MG/DL — SIGNIFICANT CHANGE UP (ref 0.5–1.3)
EOSINOPHIL # BLD AUTO: 0.27 K/UL — SIGNIFICANT CHANGE UP (ref 0–0.5)
EOSINOPHIL NFR BLD AUTO: 3.1 % — SIGNIFICANT CHANGE UP (ref 0–6)
GLUCOSE BLDC GLUCOMTR-MCNC: 206 MG/DL — HIGH (ref 70–99)
GLUCOSE BLDC GLUCOMTR-MCNC: 216 MG/DL — HIGH (ref 70–99)
GLUCOSE BLDC GLUCOMTR-MCNC: 234 MG/DL — HIGH (ref 70–99)
GLUCOSE BLDC GLUCOMTR-MCNC: 253 MG/DL — HIGH (ref 70–99)
GLUCOSE BLDC GLUCOMTR-MCNC: 298 MG/DL — HIGH (ref 70–99)
GLUCOSE SERPL-MCNC: 245 MG/DL — HIGH (ref 70–99)
HCT VFR BLD CALC: 31.1 % — LOW (ref 34.5–45)
HGB BLD-MCNC: 9.6 G/DL — LOW (ref 11.5–15.5)
IMM GRANULOCYTES NFR BLD AUTO: 3.2 % — HIGH (ref 0–1.5)
LYMPHOCYTES # BLD AUTO: 1.28 K/UL — SIGNIFICANT CHANGE UP (ref 1–3.3)
LYMPHOCYTES # BLD AUTO: 14.7 % — SIGNIFICANT CHANGE UP (ref 13–44)
MAGNESIUM SERPL-MCNC: 2 MG/DL — SIGNIFICANT CHANGE UP (ref 1.6–2.6)
MCHC RBC-ENTMCNC: 26.1 PG — LOW (ref 27–34)
MCHC RBC-ENTMCNC: 30.9 % — LOW (ref 32–36)
MCV RBC AUTO: 84.5 FL — SIGNIFICANT CHANGE UP (ref 80–100)
MONOCYTES # BLD AUTO: 0.9 K/UL — SIGNIFICANT CHANGE UP (ref 0–0.9)
MONOCYTES NFR BLD AUTO: 10.4 % — SIGNIFICANT CHANGE UP (ref 2–14)
NEUTROPHILS # BLD AUTO: 5.86 K/UL — SIGNIFICANT CHANGE UP (ref 1.8–7.4)
NEUTROPHILS NFR BLD AUTO: 67.4 % — SIGNIFICANT CHANGE UP (ref 43–77)
NRBC # FLD: 0 K/UL — SIGNIFICANT CHANGE UP (ref 0–0)
PHOSPHATE SERPL-MCNC: 4 MG/DL — SIGNIFICANT CHANGE UP (ref 2.5–4.5)
PLATELET # BLD AUTO: 504 K/UL — HIGH (ref 150–400)
PMV BLD: 9.4 FL — SIGNIFICANT CHANGE UP (ref 7–13)
POTASSIUM SERPL-MCNC: 4.5 MMOL/L — SIGNIFICANT CHANGE UP (ref 3.5–5.3)
POTASSIUM SERPL-SCNC: 4.5 MMOL/L — SIGNIFICANT CHANGE UP (ref 3.5–5.3)
PROT SERPL-MCNC: 6.5 G/DL — SIGNIFICANT CHANGE UP (ref 6–8.3)
RBC # BLD: 3.68 M/UL — LOW (ref 3.8–5.2)
RBC # FLD: 16.2 % — HIGH (ref 10.3–14.5)
SODIUM SERPL-SCNC: 136 MMOL/L — SIGNIFICANT CHANGE UP (ref 135–145)
WBC # BLD: 8.69 K/UL — SIGNIFICANT CHANGE UP (ref 3.8–10.5)
WBC # FLD AUTO: 8.69 K/UL — SIGNIFICANT CHANGE UP (ref 3.8–10.5)

## 2019-11-04 PROCEDURE — 99232 SBSQ HOSP IP/OBS MODERATE 35: CPT | Mod: GC

## 2019-11-04 PROCEDURE — 99232 SBSQ HOSP IP/OBS MODERATE 35: CPT

## 2019-11-04 PROCEDURE — 99239 HOSP IP/OBS DSCHRG MGMT >30: CPT

## 2019-11-04 PROCEDURE — 74183 MRI ABD W/O CNTR FLWD CNTR: CPT | Mod: 26

## 2019-11-04 RX ORDER — SITAGLIPTIN 50 MG/1
1 TABLET, FILM COATED ORAL
Qty: 0 | Refills: 0 | DISCHARGE

## 2019-11-04 RX ORDER — METFORMIN HYDROCHLORIDE 850 MG/1
1 TABLET ORAL
Qty: 0 | Refills: 0 | DISCHARGE

## 2019-11-04 RX ORDER — CIPROFLOXACIN LACTATE 400MG/40ML
1 VIAL (ML) INTRAVENOUS
Qty: 0 | Refills: 0 | DISCHARGE

## 2019-11-04 RX ORDER — LISINOPRIL 2.5 MG/1
1 TABLET ORAL
Qty: 0 | Refills: 0 | DISCHARGE

## 2019-11-04 RX ORDER — ACETAMINOPHEN 500 MG
650 TABLET ORAL ONCE
Refills: 0 | Status: COMPLETED | OUTPATIENT
Start: 2019-11-04 | End: 2019-11-04

## 2019-11-04 RX ORDER — LEVOTHYROXINE SODIUM 125 MCG
1 TABLET ORAL
Qty: 30 | Refills: 0
Start: 2019-11-04 | End: 2019-12-03

## 2019-11-04 RX ORDER — LEVOTHYROXINE SODIUM 125 MCG
1 TABLET ORAL
Qty: 0 | Refills: 0 | DISCHARGE

## 2019-11-04 RX ADMIN — Medication 650 MILLIGRAM(S): at 09:02

## 2019-11-04 RX ADMIN — Medication 30 MILLIGRAM(S): at 05:47

## 2019-11-04 RX ADMIN — Medication 650 MILLIGRAM(S): at 10:02

## 2019-11-04 RX ADMIN — ENOXAPARIN SODIUM 40 MILLIGRAM(S): 100 INJECTION SUBCUTANEOUS at 12:26

## 2019-11-04 RX ADMIN — Medication 2: at 18:56

## 2019-11-04 RX ADMIN — Medication 3: at 12:26

## 2019-11-04 RX ADMIN — Medication 100 MICROGRAM(S): at 05:47

## 2019-11-04 RX ADMIN — INSULIN GLARGINE 5 UNIT(S): 100 INJECTION, SOLUTION SUBCUTANEOUS at 21:44

## 2019-11-04 RX ADMIN — AMLODIPINE BESYLATE 5 MILLIGRAM(S): 2.5 TABLET ORAL at 05:47

## 2019-11-04 RX ADMIN — Medication 3 MILLILITER(S): at 11:45

## 2019-11-04 RX ADMIN — Medication 2: at 08:46

## 2019-11-04 RX ADMIN — Medication 30 MILLIGRAM(S): at 18:57

## 2019-11-04 NOTE — PROGRESS NOTE ADULT - PROBLEM SELECTOR PLAN 2
- Likely in pneumonia   - CXR w/ hazy opacities and increased marking, on PE patient w/ coarse BS/crackles in RLL concerning for PNA  - Will treat as above   - Duonebs  - Incentive spirometry  - Patient w/ small bilateral pleural effusion on CXR. Had TTE @ OSH w/ normal LVSF and mildly reduced RV systolic function. Will avoid further IVF resuscitation at this time to avoid volume overload.
- Likely in pneumonia   - CXR w/ hazy opacities and increased marking, on PE patient w/ coarse BS/crackles in RLL concerning for PNA  - Will treat as above   - Duonebs  - Incentive spirometry  - Patient w/ small bilateral pleural effusion on CXR. Had TTE @ OSH w/ normal LVSF and mildly reduced RV systolic function. Will avoid further IVF resuscitation at this time to avoid volume overload.
Resolved. Started on empiric treatment with ceftriaxone but since pt was previously on abx suspect opacities on CXR 2/2 viral PNA due to influenza. Patient without fever of leukocytosis.   - d/c ceftriaxone  - Duonebs PRN  - Incentive spirometry  - Patient w/ small bilateral pleural effusion on CXR. Had TTE @ OSH w/ normal LVSF and mildly reduced RV systolic function.
-complete CTX today  -MRCP pending

## 2019-11-04 NOTE — PROGRESS NOTE ADULT - REASON FOR ADMISSION
shortness of breath
Influenza
shortness of breath
shortness of breath

## 2019-11-04 NOTE — PROGRESS NOTE ADULT - PROBLEM SELECTOR PLAN 1
- Patient meets sepsis criteria given 10% bands, hypothermia to 96.4F and elevated lactate   - Given recent hx of septic shock 2/2 klebsiella PNA requiring MICU started on  broad spectrum antibiotics, vanc/zosyn, for presumed HCAP  - F/u blood cultures and sputum cx  - RVP + for influenza , started on Tamiflu   - UA negative, f/u urine culture   - Per review of records, patient grew pan-sensitive Klebsiella and was discharged to complete course of PO cipro until 11/5.  - Lactate improved s/p 1L NS  -ID eval for ABx management appreciated , recommend IV ceftriaxone until 11/5 , cover for Klebsiella and Pna
- Patient meets sepsis criteria given 10% bands, hypothermia to 96.4F and elevated lactate   - Given recent hx of septic shock 2/2 klebsiella PNA requiring MICU will c/w broad spectrum antibiotics, vanc/zosyn, for presumed HCAP  - F/u blood cultures and sputum cx  - RVP + for influenza , started on Tamiflu   - UA negative, f/u urine culture   - Per review of records, patient grew pan-sensitive Klebsiella and was discharged to complete course of PO cipro until 11/5.  - Lactate improved s/p 1L NS  - will request ID eval for ABx management
Likely source of presenting symptoms. RVP + for influenza , started on Tamiflu   Urine and blood cx negative.   - continue Tamiflu to complete 5-day course the morning of 11/6/19
-better  -complete tamiflu tomorrow

## 2019-11-04 NOTE — DISCHARGE NOTE NURSING/CASE MANAGEMENT/SOCIAL WORK - PATIENT PORTAL LINK FT
You can access the FollowMyHealth Patient Portal offered by [facility] by registering at the following website: [email or web address]. By joining [employer]’s FollowMyHealth portal, you will also be able to view your health information using other applications (apps) compatible with our system.

## 2019-11-04 NOTE — DISCHARGE NOTE PROVIDER - NSFOLLOWUPCLINICS_GEN_ALL_ED_FT
[facility] Endocrinology  Endocrinology  [address] [place] [address]  Phone: [phone]  Fax:   Follow Up Time: Routine

## 2019-11-04 NOTE — PROGRESS NOTE ADULT - ATTENDING COMMENTS
Doing better  Planned for ERCP in AM  Cont CTX/Tamiflu    Peterson Baird  Attending Physician   Division of Infectious Disease  Pager #[phone]  After 5pm/weekend or no response, call #[phone]    Please call the ID service [phone] with questions or concerns over the weekend.
Peterson Baird  Attending Physician   Division of Infectious Disease  Pager #[phone]  After 5pm/weekend or no response, call #[phone]

## 2019-11-04 NOTE — PROGRESS NOTE ADULT - PROBLEM SELECTOR PLAN 3
Patient w/ recent admission for septic shock due to presumed ascending cholangitis at OSH. ERCP was deferred at that time and patient had similar abd US findings of GB distention and intrahepatic biliary ductal dilation.   - Currently patient denies RUQ pain.   - f/u MRCP report  - Trend LFTs , trending down  - completed appropriate abx course for recent diagnosis of cholangitis. No need for further abx at this time.

## 2019-11-04 NOTE — DISCHARGE NOTE PROVIDER - CARE PROVIDER_API CALL
Tre Hunt (MD)  Gastroenterology; Internal Medicine  [address] [place] [address]  Phone: [phone]  Fax: [phone]  Follow Up Time: 2 weeks    Lorenzo,   Phone: [phone]  Fax: (   )    -  Follow Up Time:

## 2019-11-04 NOTE — DISCHARGE NOTE PROVIDER - NSDCMRMEDTOKEN_GEN_ALL_CORE_FT
amLODIPine 5 mg oral tablet: 1 tab(s) orally once a day  aspirin 81 mg oral delayed release tablet: 1 tab(s) orally once a day  calcium carbonate: 1 tab(s) orally once a day  ciprofloxacin 500 mg oral tablet: 1 tab(s) orally every 12 hours until 11/5/2019  famotidine 20 mg oral tablet: 1 tab(s) orally once a day  ferrous sulfate 325 mg (65 mg elemental iron) oral tablet: 1 tab(s) orally once a day  Fish Oil oral capsule: 1 cap(s) orally once a day  glipiZIDE 10 mg oral tablet: 1 tab(s) orally 2 times a day (with meals)  On HOLD since starting Levemir  Januvia 100 mg oral tablet: 1 tab(s) orally once a day  Levemir 100 units/mL subcutaneous solution: 30 unit(s) subcutaneous once a day (at bedtime)  levothyroxine 88 mcg (0.088 mg) oral tablet: 1 tab(s) orally once a day  lisinopril 20 mg oral tablet: 1 tab(s) orally once a day  Decreased from 40mg once daily since patient came home from [place] due to CARMENZA   metFORMIN 850 mg oral tablet: 1 tab(s) orally 2 times a day  On HOLD since starting Levemir  Multiple Vitamins oral tablet: 1 tab(s) orally once a day  Prolia 60 mg/mL subcutaneous solution: 1 dose(s) subcutaneous every 6 months  rosuvastatin 20 mg oral tablet: 1 tab(s) orally once a day  Tylenol 8 HR Arthritis Pain 650 mg oral tablet, extended release: 2 tab(s) orally every 8 hours  Vitamin B12: 1 tab(s) orally once a day  Vitamin D3 1000 intl units oral tablet: 1 tab(s) orally once a day amLODIPine 5 mg oral tablet: 1 tab(s) orally once a day  aspirin 81 mg oral delayed release tablet: 1 tab(s) orally once a day  calcium carbonate: 1 tab(s) orally once a day  famotidine 20 mg oral tablet: 1 tab(s) orally once a day  ferrous sulfate 325 mg (65 mg elemental iron) oral tablet: 1 tab(s) orally once a day  Fish Oil oral capsule: 1 cap(s) orally once a day  glipiZIDE 10 mg oral tablet: 1 tab(s) orally 2 times a day (with meals)  On HOLD since starting Levemir  guaiFENesin 100 mg/5 mL oral liquid: 5 milliliter(s) orally every 6 hours, As needed, Cough    OTC Medication  Januvia 100 mg oral tablet: 1 tab(s) orally once a day  Levemir 100 units/mL subcutaneous solution: 30 unit(s) subcutaneous once a day (at bedtime)  levothyroxine 88 mcg (0.088 mg) oral tablet: 1 tab(s) orally once a day  metFORMIN 850 mg oral tablet: 1 tab(s) orally 2 times a day  On HOLD since starting Levemir  Multiple Vitamins oral tablet: 1 tab(s) orally once a day  Prolia 60 mg/mL subcutaneous solution: 1 dose(s) subcutaneous every 6 months  rosuvastatin 20 mg oral tablet: 1 tab(s) orally once a day  Tylenol 8 HR Arthritis Pain 650 mg oral tablet, extended release: 2 tab(s) orally every 8 hours  Vitamin B12: 1 tab(s) orally once a day  Vitamin D3 1000 intl units oral tablet: 1 tab(s) orally once a day amLODIPine 5 mg oral tablet: 1 tab(s) orally once a day  aspirin 81 mg oral delayed release tablet: 1 tab(s) orally once a day  calcium carbonate: 1 tab(s) orally once a day  famotidine 20 mg oral tablet: 1 tab(s) orally once a day  ferrous sulfate 325 mg (65 mg elemental iron) oral tablet: 1 tab(s) orally once a day  Fish Oil oral capsule: 1 cap(s) orally once a day  guaiFENesin 100 mg/5 mL oral liquid: 5 milliliter(s) orally every 6 hours, As needed, Cough    OTC Medication  Levemir 100 units/mL subcutaneous solution: 10 unit(s) subcutaneous once a day (at bedtime)  levothyroxine 88 mcg (0.088 mg) oral tablet: 1 tab(s) orally once a day  Multiple Vitamins oral tablet: 1 tab(s) orally once a day  Prolia 60 mg/mL subcutaneous solution: 1 dose(s) subcutaneous every 6 months  rosuvastatin 20 mg oral tablet: 1 tab(s) orally once a day  Tylenol 8 HR Arthritis Pain 650 mg oral tablet, extended release: 2 tab(s) orally every 8 hours  Vitamin B12: 1 tab(s) orally once a day  Vitamin D3 1000 intl units oral tablet: 1 tab(s) orally once a day amLODIPine 5 mg oral tablet: 1 tab(s) orally once a day  aspirin 81 mg oral delayed release tablet: 1 tab(s) orally once a day  calcium carbonate: 1 tab(s) orally once a day  famotidine 20 mg oral tablet: 1 tab(s) orally once a day  ferrous sulfate 325 mg (65 mg elemental iron) oral tablet: 1 tab(s) orally once a day  Fish Oil oral capsule: 1 cap(s) orally once a day  guaiFENesin 100 mg/5 mL oral liquid: 5 milliliter(s) orally every 6 hours, As needed, Cough    OTC Medication  Levemir 100 units/mL subcutaneous solution: 10 unit(s) subcutaneous once a day (at bedtime)  levothyroxine 100 mcg (0.1 mg) oral tablet: 1 tab(s) orally once a day  Multiple Vitamins oral tablet: 1 tab(s) orally once a day  oseltamivir 30 mg oral capsule: 1 cap(s) orally 2 times a day  Prolia 60 mg/mL subcutaneous solution: 1 dose(s) subcutaneous every 6 months  rosuvastatin 20 mg oral tablet: 1 tab(s) orally once a day  Tylenol 8 HR Arthritis Pain 650 mg oral tablet, extended release: 2 tab(s) orally every 8 hours  Vitamin B12: 1 tab(s) orally once a day  Vitamin D3 1000 intl units oral tablet: 1 tab(s) orally once a day

## 2019-11-04 NOTE — PROGRESS NOTE ADULT - PROBLEM SELECTOR PLAN 4
- A1c  10.5   - Patient hypoglycemic to 60 in ED and was started on Lantus 30U qHS by PCP a few days ago. She was previously on Metformin Glipizide and Sitapliptin, which patient states were just recently stopped    - increase to Lantus 10 units daily  - pt received diabetic teaching/education today. Pt to f/u with outpatient PMD

## 2019-11-04 NOTE — PROGRESS NOTE ADULT - SUBJECTIVE AND OBJECTIVE BOX
Chief Complaint:  Patient is a 79y old  Female who presents with a chief complaint of shortness of breath (03 Nov 2019 15:03)      Interval Events: Pt denies nausea vomiting abdominal pain. MRCP pending.   ROS: All 12 point system except listed above were otherwise negative.    Allergies:  No Known Drug Allergies  Seafood (Rash)        Hospital Medications:  acetaminophen   Tablet .. 650 milliGRAM(s) Oral once  albuterol/ipratropium for Nebulization 3 milliLiter(s) Nebulizer every 6 hours  amLODIPine   Tablet 5 milliGRAM(s) Oral daily  cefTRIAXone   IVPB 1000 milliGRAM(s) IV Intermittent every 24 hours  dextrose 40% Gel 15 Gram(s) Oral once PRN  dextrose 5%. 1000 milliLiter(s) IV Continuous <Continuous>  dextrose 50% Injectable 12.5 Gram(s) IV Push once  dextrose 50% Injectable 25 Gram(s) IV Push once  dextrose 50% Injectable 25 Gram(s) IV Push once  enoxaparin Injectable 40 milliGRAM(s) SubCutaneous daily  glucagon  Injectable 1 milliGRAM(s) IntraMuscular once PRN  guaiFENesin    Syrup 100 milliGRAM(s) Oral every 6 hours PRN  insulin glargine Injectable (LANTUS) 5 Unit(s) SubCutaneous at bedtime  insulin lispro (HumaLOG) corrective regimen sliding scale   SubCutaneous three times a day before meals  insulin lispro (HumaLOG) corrective regimen sliding scale   SubCutaneous at bedtime  levothyroxine 100 MICROGram(s) Oral daily  melatonin 3 milliGRAM(s) Oral at bedtime PRN  oseltamivir 30 milliGRAM(s) Oral two times a day      PMHX/PSHX:  GERD (gastroesophageal reflux disease)  HLD (hyperlipidemia)  Sepsis due to Klebsiella  Hypothyroidism  Hypertension  Type 2 diabetes mellitus  No significant past surgical history      Family history:  No pertinent family history in first degree relatives    There is no family history of peptic ulcer disease, gastric cancer, colon polyps, colon cancer, celiac disease, biliary, hepatic, or pancreatic disease.  None of the female relatives have breast, uterine, or ovarian cancer.     PHYSICAL EXAM:   Vital Signs:  Vital Signs Last 24 Hrs  T(C): 36.9 (04 Nov 2019 05:41), Max: 37.3 (03 Nov 2019 13:45)  T(F): 98.5 (04 Nov 2019 05:41), Max: 99.1 (03 Nov 2019 13:45)  HR: 80 (04 Nov 2019 05:41) (78 - 91)  BP: 155/79 (04 Nov 2019 05:41) (126/63 - 155/79)  BP(mean): --  RR: 20 (04 Nov 2019 05:41) (18 - 20)  SpO2: 100% (04 Nov 2019 05:41) (98% - 100%)  Daily     Daily       PHYSICAL EXAM:     GENERAL:  Appears stated age, well-groomed  HEENT:  NC/AT,  conjunctivae clear and pink, no thyromegaly, nodules, adenopathy, no JVD, sclera -anicteric  CHEST:  Full & symmetric excursion, no increased effort, breath sounds clear  HEART:  Regular rhythm, S1, S2, no murmur/rub/S3/S4, no abdominal bruit, no edema  ABDOMEN:  Soft, non-tender, non-distended, normoactive bowel sounds  EXTEREMITIES:  no cyanosis,clubbing or edema  SKIN:  No rash/erythema/ecchymoses  NEURO:  Alert, oriented, no asterixis      LABS:                        9.6    8.69  )-----------( 504      ( 04 Nov 2019 06:54 )             31.1     Mean Cell Volume: 84.5 fL (11-04-19 @ 06:54)    11-04    136  |  97<L>  |  12  ----------------------------<  245<H>  4.5   |  27  |  1.05    Ca    9.5      04 Nov 2019 06:54  Phos  4.0     11-04  Mg     2.0     11-04    TPro  6.5  /  Alb  3.4  /  TBili  0.6  /  DBili  x   /  AST  30  /  ALT  25  /  AlkPhos  212<H>  11-04    LIVER FUNCTIONS - ( 04 Nov 2019 06:54 )  Alb: 3.4 g/dL / Pro: 6.5 g/dL / ALK PHOS: 212 u/L / ALT: 25 u/L / AST: 30 u/L / GGT: x                                       9.6    8.69  )-----------( 504      ( 04 Nov 2019 06:54 )             31.1                         10.1   9.39  )-----------( 474      ( 03 Nov 2019 06:16 )             32.9                         10.1   8.40  )-----------( 439      ( 02 Nov 2019 06:53 )             32.6                         11.3   8.96  )-----------( 392      ( 01 Nov 2019 10:10 )             34.6     Imaging:

## 2019-11-04 NOTE — DISCHARGE NOTE PROVIDER - PROVIDER TOKENS
PROVIDER:[TOKEN:[[id number]],FOLLOWUP:[2 weeks]],FREE:[LAST:[Lorenzo],PHONE:[phone]],FAX:[(   )    -]]

## 2019-11-04 NOTE — DISCHARGE NOTE PROVIDER - NSDCCPCAREPLAN_GEN_ALL_CORE_FT
PRINCIPAL DISCHARGE DIAGNOSIS  Diagnosis: Pneumonia  Assessment and Plan of Treatment: Completed course of antibiotics. Please follow up with your primary care provider within 1 week of discharge.      SECONDARY DISCHARGE DIAGNOSES  Diagnosis: Influenza  Assessment and Plan of Treatment: Please complete course of Tamiflu as directed. Follow up with PCP within 1 week of discharge.    Diagnosis: Essential hypertension  Assessment and Plan of Treatment: Continue with Amlodipine. Your Lisniopril was held secondary to concern for sepsis and your blood pressure was stable off of it - please follow up with your primary care provider for when/if it should be resumed. Monitor for any visual changes, headaches or dizziness.  Monitor blood pressure regularly.  Follow up with your PCP for further management for high blood pressure, please call to make appointment within 1 week of discharge    Diagnosis: Type 2 diabetes mellitus without complication, with long-term current use of insulin  Assessment and Plan of Treatment: Continue consistent carbohydrate diet.  Monitor blood glucose levels throughout the day before meals and at bedtime.  Record blood sugars and bring to outpatient providers appointment in order to be reviewed by your doctor for management modifications.  Be aware of diabetes management symptoms including feeling cool and clammy may be related to low glucose levels.  Feeling hot and dry may indicate high glucose levels.  If  you feel these symptoms, check your blood sugar.  Make regular podiatry appointments in order to have feet checked for wounds and toe nails cut by a doctor to prevent infections.    Diagnosis: Elevated LFTs  Assessment and Plan of Treatment: Follow up with your PCP within 1 week of discharge for further monitoring and management - You are scheduled for an appt on 11/11/19 @ 12 pm with ASHLEY Beckett ([phone]).    Diagnosis: Hypothyroidism  Assessment and Plan of Treatment: Your synthroid dose was increased to 100 mcg daily. Please follow up with your PCP for repeat TFTs in 4-6 weeks of discharge. PRINCIPAL DISCHARGE DIAGNOSIS  Diagnosis: Pneumonia  Assessment and Plan of Treatment: Completed course of antibiotics. Please follow up with your primary care provider within 1 week of discharge.      SECONDARY DISCHARGE DIAGNOSES  Diagnosis: Influenza  Assessment and Plan of Treatment: Please complete course of Tamiflu as directed. Follow up with PCP within 1 week of discharge.    Diagnosis: Essential hypertension  Assessment and Plan of Treatment: Continue with Amlodipine. Your Lisniopril was held secondary to concern for sepsis and your blood pressure was stable off of it - please follow up with your primary care provider for when/if it should be resumed. Monitor for any visual changes, headaches or dizziness.  Monitor blood pressure regularly.  Follow up with your PCP for further management for high blood pressure, please call to make appointment within 1 week of discharge    Diagnosis: Type 2 diabetes mellitus without complication, with long-term current use of insulin  Assessment and Plan of Treatment: Continue consistent carbohydrate diet.  Monitor blood glucose levels throughout the day before meals and at bedtime.  Record blood sugars and bring to outpatient providers appointment in order to be reviewed by your doctor for management modifications.  Be aware of diabetes management symptoms including feeling cool and clammy may be related to low glucose levels.  Feeling hot and dry may indicate high glucose levels.  If  you feel these symptoms, check your blood sugar.  Make regular podiatry appointments in order to have feet checked for wounds and toe nails cut by a doctor to prevent infections.    Diagnosis: Elevated LFTs  Assessment and Plan of Treatment: You had an MRCP prior to discharge. Follow up with your PCP and/or gastroenterologist within 1 week of discharge for results of imaging as well as further monitoring and management. You are scheduled for an appt with your PCP on 11/11/19 @ 12 pm with ASHLEY Beckett ([phone]).    Diagnosis: Hypothyroidism  Assessment and Plan of Treatment: Your synthroid dose was increased to 100 mcg daily. Please follow up with your PCP for repeat TFTs in 4-6 weeks of discharge.

## 2019-11-04 NOTE — DISCHARGE NOTE PROVIDER - CARE PROVIDERS DIRECT ADDRESSES
[email or web address]

## 2019-11-04 NOTE — PROGRESS NOTE ADULT - PROBLEM SELECTOR PLAN 6
auscultated w/ stethoscope
- BP well controlled at this time  - Given concern for sepsis, will hold home lisinopril and restart amlodipine w/ hold parameters
- BP well controlled at this time  - Given concern for sepsis, will hold home lisinopril and restart amlodipine w/ hold parameters
- BP well controlled at this time  - continue amlodipine w/ hold parameters  - can also resume home regimen of lisinopril

## 2019-11-04 NOTE — PROGRESS NOTE ADULT - PROBLEM SELECTOR PLAN 8
1.  Name of PCP: Dr. Ventura ([place])/ NP Magdalena Beckett   2.  PCP Contacted on Admission: [ ] Y    [ ] N    3.  PCP contacted at Discharge: [x ] Y; office# [phone]; fax# [phone] [ ] N    [ ] N/A  4.  Post-Discharge Appointment Date and Location: 11/11/19 at 12pm with Magdalena Beckett NP  5.  Summary of Handoff given to PCP: Yes    SPENT 40 MIN COORDINATING DISCHARGE PLAN AND F/U CARE

## 2019-11-04 NOTE — PROGRESS NOTE ADULT - SUBJECTIVE AND OBJECTIVE BOX
Patient is a 79y old  Female who presents with a chief complaint of shortness of breath (04 Nov 2019 17:07)    SUBJECTIVE / OVERNIGHT EVENTS:  Patient seen and examined in the afternoon. Patient's family, including her daughter, present at bedside. Patient feels well. No chest pain, SOB, n/v or abdominal pain. Patient eating well.     MEDICATIONS  (STANDING):  albuterol/ipratropium for Nebulization 3 milliLiter(s) Nebulizer every 6 hours  amLODIPine   Tablet 5 milliGRAM(s) Oral daily  dextrose 5%. 1000 milliLiter(s) (50 mL/Hr) IV Continuous <Continuous>  dextrose 50% Injectable 12.5 Gram(s) IV Push once  dextrose 50% Injectable 25 Gram(s) IV Push once  dextrose 50% Injectable 25 Gram(s) IV Push once  enoxaparin Injectable 40 milliGRAM(s) SubCutaneous daily  insulin glargine Injectable (LANTUS) 5 Unit(s) SubCutaneous at bedtime  insulin lispro (HumaLOG) corrective regimen sliding scale   SubCutaneous three times a day before meals  insulin lispro (HumaLOG) corrective regimen sliding scale   SubCutaneous at bedtime  levothyroxine 100 MICROGram(s) Oral daily  oseltamivir 30 milliGRAM(s) Oral two times a day    MEDICATIONS  (PRN):  dextrose 40% Gel 15 Gram(s) Oral once PRN Blood Glucose LESS THAN 70 milliGRAM(s)/deciliter  glucagon  Injectable 1 milliGRAM(s) IntraMuscular once PRN Glucose LESS THAN 70 milligrams/deciliter  guaiFENesin    Syrup 100 milliGRAM(s) Oral every 6 hours PRN Cough  melatonin 3 milliGRAM(s) Oral at bedtime PRN Insomnia      Vital Signs Last 24 Hrs  T(C): 37 (04 Nov 2019 18:30), Max: 37.1 (03 Nov 2019 22:00)  T(F): 98.6 (04 Nov 2019 18:30), Max: 98.8 (03 Nov 2019 22:00)  HR: 81 (04 Nov 2019 18:30) (80 - 87)  BP: 149/65 (04 Nov 2019 18:30) (130/54 - 155/79)  BP(mean): --  RR: 18 (04 Nov 2019 18:30) (17 - 20)  SpO2: 100% (04 Nov 2019 18:30) (96% - 100%)      CAPILLARY BLOOD GLUCOSE  POCT Blood Glucose.: 234 mg/dL (04 Nov 2019 18:48)  POCT Blood Glucose.: 253 mg/dL (04 Nov 2019 17:00)  POCT Blood Glucose.: 298 mg/dL (04 Nov 2019 12:20)  POCT Blood Glucose.: 216 mg/dL (04 Nov 2019 08:35)  POCT Blood Glucose.: 278 mg/dL (03 Nov 2019 22:00)          PHYSICAL EXAM  GENERAL: NAD, well-appearing, thin framed  CHEST/LUNG: Clear to auscultation bilaterally; No wheeze  HEART: Regular rate and rhythm; No murmurs, rubs, or gallops  ABDOMEN: Soft, Nontender, Nondistended; Bowel sounds present  EXTREMITIES:  2+ Peripheral Pulses, No clubbing, cyanosis, or edema  PSYCH: AAOx3      LABS:                        9.6    8.69  )-----------( 504      ( 04 Nov 2019 06:54 )             31.1     11-04    136  |  97<L>  |  12  ----------------------------<  245<H>  4.5   |  27  |  1.05    Ca    9.5      04 Nov 2019 06:54  Phos  4.0     11-04  Mg     2.0     11-04    TPro  6.5  /  Alb  3.4  /  TBili  0.6  /  DBili  x   /  AST  30  /  ALT  25  /  AlkPhos  212<H>  11-04        RADIOLOGY & ADDITIONAL TESTS:    PENDING MRCP RESULTS - TEST PERFORMED TODAY      Consultant(s) Notes Reviewed:  YES  Care Discussed with Consultants/Other Providers:

## 2019-11-04 NOTE — PROGRESS NOTE ADULT - ASSESSMENT
79F hx of T2DM, HTN, hypothyroidism and recent hospitalization for septic shock 2/2 klebsiella bacteremia due to cholangitis (in [place]) presenting with one day of shortness of breath admitted w/ sepsis likely 2/2 HCAP.
79F hx of T2DM, HTN, hypothyroidism and recent hospitalization for septic shock 2/2 klebsiella bacteremia due to cholangitis (in [place]) presenting with one day of shortness of breath admitted w/ sepsis likely 2/2 HCAP.
79F hx of T2DM, HTN, hypothyroidism and recent hospitalization for septic shock 2/2 klebsiella bacteremia due to cholangitis (in [place]) presenting with one day of shortness of breath admitted w/ sepsis likely 2/2 influenza. Patient no longer septic and clinically improved. However, patient pending further w/u for noted biliary duct dilatation.
79F hx of T2DM, HTN, hypothyroidism and recent hospitalization for septic shock 2/2 klebsiella bacteremia due to cholangitis (in [place]) presenting with one day of shortness of breath.   Patient was visiting family in [facility] and while there she developed cough and RUQ pain. During flight back to [facility], patient developed confusion and pre-syncopal event and the flight was diverted to [facility]. She was taken to hospital where she was admitted from 10/24-10/29/19 for septic shock from klebsiella bacteremia from cholangitis-Abs US showed dilated GB w/ dilation of intra-hepatic ducts. She was discharged on Cipro 500mg BID to be completed on 11/5.   Here patient is afebrile, no leukocytosis but 10% bands, x ray s/o consolidation, USG with dilation of intra hepatic ducts similar to records from [place].   Lipase level is 116.5. Blood cx and urine cx are negative.   Currently on Vancomycin 750 q12 and Zosyn 3.375 q8. RVP positive for flu- Tamiflu      Influenza pneumonia - low suspicion for bacterial pneumonia   recent Klebsiella bacteremia on antibiotics    Suggest:  *c/w Tamiflu 30 BID   *c/w Ceftriaxone - will cover for klebsiella bacteremia as well as streptococcal pneumonia   *continue Ceftriaxone until 11/5  *trend CBC
Impression:  1) Fever, chills- Pt with imaging findings of intrahepatic biliary ductal dilatation without elevated liver enzymes. Criteria not met for acute cholangitis based on [place] guidelines for acute cholangitis. Differential diagnosis of biliary dilatation includes choledocholithiasis (that has passed), benign strictures   2) Influenza +    Recommendations:  -Follow up MRI/MRCP to further evaluate biliary tree  -Monitor temperature curve, CBC, CMP  -Rest of care per primary team
79F hx of T2DM, HTN, hypothyroidism and recent hospitalization for septic shock 2/2 klebsiella bacteremia due to cholangitis (in [place]) presenting with one day of shortness of breath.   Patient was visiting family in [facility] and while there she developed cough and RUQ pain. During flight back to [facility], patient developed confusion and pre-syncopal event and the flight was diverted to [facility]. She was taken to hospital where she was admitted from 10/24-10/29/19 for septic shock from klebsiella bacteremia from cholangitis-Abs US showed dilated GB w/ dilation of intra-hepatic ducts. She was discharged on Cipro 500mg BID to be completed on 11/5.   Here patient is afebrile, no leukocytosis but 10% bands, x ray s/o consolidation, USG with dilation of intra hepatic ducts similar to records from [place].   Lipase level is 116.5. Blood cx and urine cx are negative.   Currently on Vancomycin 750 q12 and Zosyn 3.375 q8. RVP positive for flu- Tamiflu      Influenza pneumonia - low suspicion for bacterial pneumonia   recent Klebsiella bacteremia on antibiotics

## 2019-11-04 NOTE — PROGRESS NOTE ADULT - SUBJECTIVE AND OBJECTIVE BOX
[place], [place] 79y [id number]    Patient is a 79y old  Female who presents with a chief complaint of shortness of breath (04 Nov 2019 08:59)      Follow Up/CC:  ID following for flu    Interval History/ROS: no fever    Allergies    No Known Drug Allergies  Seafood (Rash)    Intolerances        ANTIMICROBIALS:  oseltamivir 30 two times a day      MEDICATIONS  (STANDING):  albuterol/ipratropium for Nebulization 3 milliLiter(s) Nebulizer every 6 hours  amLODIPine   Tablet 5 milliGRAM(s) Oral daily  dextrose 5%. 1000 milliLiter(s) (50 mL/Hr) IV Continuous <Continuous>  dextrose 50% Injectable 12.5 Gram(s) IV Push once  dextrose 50% Injectable 25 Gram(s) IV Push once  dextrose 50% Injectable 25 Gram(s) IV Push once  enoxaparin Injectable 40 milliGRAM(s) SubCutaneous daily  insulin glargine Injectable (LANTUS) 5 Unit(s) SubCutaneous at bedtime  insulin lispro (HumaLOG) corrective regimen sliding scale   SubCutaneous three times a day before meals  insulin lispro (HumaLOG) corrective regimen sliding scale   SubCutaneous at bedtime  levothyroxine 100 MICROGram(s) Oral daily  oseltamivir 30 milliGRAM(s) Oral two times a day    MEDICATIONS  (PRN):  dextrose 40% Gel 15 Gram(s) Oral once PRN Blood Glucose LESS THAN 70 milliGRAM(s)/deciliter  glucagon  Injectable 1 milliGRAM(s) IntraMuscular once PRN Glucose LESS THAN 70 milligrams/deciliter  guaiFENesin    Syrup 100 milliGRAM(s) Oral every 6 hours PRN Cough  melatonin 3 milliGRAM(s) Oral at bedtime PRN Insomnia        Vital Signs Last 24 Hrs  T(C): 36.6 (04 Nov 2019 14:30), Max: 37.2 (03 Nov 2019 17:45)  T(F): 97.9 (04 Nov 2019 14:30), Max: 99 (03 Nov 2019 17:45)  HR: 83 (04 Nov 2019 14:30) (80 - 91)  BP: 133/50 (04 Nov 2019 14:30) (130/54 - 155/79)  BP(mean): --  RR: 17 (04 Nov 2019 14:30) (17 - 20)  SpO2: 99% (04 Nov 2019 14:30) (96% - 100%)    CBC Full  -  ( 04 Nov 2019 06:54 )  WBC Count : 8.69 K/uL  RBC Count : 3.68 M/uL  Hemoglobin : 9.6 g/dL  Hematocrit : 31.1 %  Platelet Count - Automated : 504 K/uL  Mean Cell Volume : 84.5 fL  Mean Cell Hemoglobin : 26.1 pg  Mean Cell Hemoglobin Concentration : 30.9 %  Auto Neutrophil # : 5.86 K/uL  Auto Lymphocyte # : 1.28 K/uL  Auto Monocyte # : 0.90 K/uL  Auto Eosinophil # : 0.27 K/uL  Auto Basophil # : 0.10 K/uL  Auto Neutrophil % : 67.4 %  Auto Lymphocyte % : 14.7 %  Auto Monocyte % : 10.4 %  Auto Eosinophil % : 3.1 %  Auto Basophil % : 1.2 %    11-04    136  |  97<L>  |  12  ----------------------------<  245<H>  4.5   |  27  |  1.05    Ca    9.5      04 Nov 2019 06:54  Phos  4.0     11-04  Mg     2.0     11-04    TPro  6.5  /  Alb  3.4  /  TBili  0.6  /  DBili  x   /  AST  30  /  ALT  25  /  AlkPhos  212<H>  11-04    LIVER FUNCTIONS - ( 04 Nov 2019 06:54 )  Alb: 3.4 g/dL / Pro: 6.5 g/dL / ALK PHOS: 212 u/L / ALT: 25 u/L / AST: 30 u/L / GGT: x               MICROBIOLOGY:  Peripheral Site 1  11-01-19 --  --  --      URINE MIDSTREAM  11-01-19 --  --  --      RADIOLOGY    < from: Xray Chest 2 Views PA/Lat (11.01.19 @ 13:27) >  Hazy indistinct bilateral CP angle suggesting small pleural effusions.   Vague increased markings and streaky opacities in both lung bases   compatible with associated consolidative changes, infection/pneumonia in   the proper clinical context cannot be excluded.    Clear mid and upper lungs and no pneumothorax.    Aortic calcifications. Otherwise cardiac and mediastinal silhouettes   grossly within normal limits.    Trachea midline.    Generalized osteopenia.    < end of copied text >

## 2019-11-04 NOTE — DISCHARGE NOTE PROVIDER - HOSPITAL COURSE
79F hx of T2DM, HTN, hypothyroidism and recent hospitalization for septic shock 2/2 klebsiella bacteremia due to cholangitis (in [place]) presenting with one day of shortness of breath admitted w/ sepsis.         Hospital course:             Sepsis.      - On admission, patient met sepsis criteria given 10% bands, hypothermia to 96.4F and elevated lactate     - Given recent hx of septic shock 2/2 klebsiella PNA requiring MICU, patient was started on broad spectrum antibiotics (vanc/zosyn, for presumed HCAP)    - ID consulted: Transitioned to CTX as was discharged on that from prior hospital, completed prior to dc     - RVP + for influenza , started on Tamiflu (plan to complete upon discharge)     - Blood cx: NTD     - UA / Urine culture: negative     - Lactate improved s/p 1L NS            Shortness of breath.     - Likely in pneumonia/Influenza     - CXR w/ hazy opacities and increased marking    - on PE patient w/ coarse BS/crackles in RLL concerning for PNA    - ID consulted: abx as above     - Duonebs    - Incentive spirometry    - Patient w/ small bilateral pleural effusion on CXR. Had TTE @ OSH w/ normal LVSF and mildly reduced RV systolic function.             Elevated LFTs.     - Patient w/ recent admission for septic shock due to presumed ascending cholangitis at OSH     - ERCP was deferred at that time and patient had similar abd US findings of GB distention and intrahepatic biliary ductal dilation.     - Currently patient denies RUQ pain.     - Will hold off CT A/P as patient had recent imaging at OSH    - GI consulted    - MRCP ------------    - LFTs down trended prior to discharge             Type 2 diabetes mellitus without complication, with long-term current use of insulin.      - Patient hypoglycemic to 60 in ED and was started on Lantus 30U qHS by PCP a few days ago. She was previously on Metformin Glipizide and Sitapliptin, which patient states were just recently stopped      - A1c  10.5     - Initially held all long acting insulin due to hypoglycemic event and monitored FS qAC and qHS on low dose ISS    - Then started lower dose of lantus    - Endocrine and Nutrition consulted ------------------            Hypothyroidism.     -  TSH 25, FT4 0.64    - increased  Synthroid to 100 mcg.             Essential hypertension.    - BP well controlled at this time    - Given concern for sepsis, held home lisinopril     - Restarted amlodipine w/ hold parameters.            Prophylactic measure.      - DVT ppx: lovenox    - Diet: regular    - PT consult: no needs         Per medical attg, pt is stable for discahrge on 11/4/19.             Dispo: home 79F hx of T2DM, HTN, hypothyroidism and recent hospitalization for septic shock 2/2 klebsiella bacteremia due to cholangitis (in [place]) presenting with one day of shortness of breath admitted w/ sepsis.         Hospital course:             Sepsis.      - On admission, patient met sepsis criteria given 10% bands, hypothermia to 96.4F and elevated lactate     - Given recent hx of septic shock 2/2 klebsiella PNA requiring MICU, patient was started on broad spectrum antibiotics (vanc/zosyn, for presumed HCAP)    - ID consulted: Transitioned to CTX as was discharged on that from prior hospital, completed prior to dc     - RVP + for influenza , started on Tamiflu (plan to complete upon discharge)     - Blood cx: NTD     - UA / Urine culture: negative     - Lactate improved s/p 1L NS            Shortness of breath.     - Likely in pneumonia/Influenza     - CXR w/ hazy opacities and increased marking    - on PE patient w/ coarse BS/crackles in RLL concerning for PNA    - ID consulted: abx as above     - Duonebs    - Incentive spirometry    - Patient w/ small bilateral pleural effusion on CXR. Had TTE @ OSH w/ normal LVSF and mildly reduced RV systolic function.             Elevated LFTs.     - Patient w/ recent admission for septic shock due to presumed ascending cholangitis at OSH     - ERCP was deferred at that time and patient had similar abd US findings of GB distention and intrahepatic biliary ductal dilation.     - Currently patient denies RUQ pain.     - Will hold off CT A/P as patient had recent imaging at OSH    - GI consulted    - MRCP was performed on 11/4 prior to discharge. Please follow up with GI/PCP for results     - LFTs down trended prior to discharge             Type 2 diabetes mellitus without complication, with long-term current use of insulin.      - Patient hypoglycemic to 60 in ED and was started on Lantus 30U qHS by PCP a few days ago. She was previously on Metformin Glipizide and Sitapliptin, which patient states were just recently stopped      - A1c  10.5     - Initially held all long acting insulin due to hypoglycemic event and monitored FS qAC and qHS on low dose ISS    - Then started lower dose of lantus    - Endocrine and Nutrition consulted ------------------            Hypothyroidism.     -  TSH 25, FT4 0.64    - increased  Synthroid to 100 mcg.             Essential hypertension.    - BP well controlled at this time    - Given concern for sepsis, held home lisinopril     - Restarted amlodipine w/ hold parameters.            Prophylactic measure.      - DVT ppx: lovenox    - Diet: regular    - PT consult: no needs         Per medical attg, pt is stable for discahrge on 11/4/19.             Dispo: home 79F hx of T2DM, HTN, hypothyroidism and recent hospitalization for septic shock 2/2 klebsiella bacteremia due to cholangitis (in [place]) presenting with one day of shortness of breath admitted w/ sepsis.         Hospital course:             Sepsis.      - On admission, patient met sepsis criteria given 10% bands, hypothermia to 96.4F and elevated lactate     - Given recent hx of septic shock 2/2 klebsiella PNA requiring MICU, patient was started on broad spectrum antibiotics (vanc/zosyn, for presumed HCAP)    - ID consulted: Transitioned to CTX as was discharged on that from prior hospital, completed prior to dc     - RVP + for influenza , started on Tamiflu (plan to complete upon discharge)     - Blood cx: NTD     - UA / Urine culture: negative     - Lactate improved s/p 1L NS            Shortness of breath.     - Likely in pneumonia/Influenza     - CXR w/ hazy opacities and increased marking    - on PE patient w/ coarse BS/crackles in RLL concerning for PNA    - ID consulted: abx as above     - Duonebs    - Incentive spirometry    - Patient w/ small bilateral pleural effusion on CXR. Had TTE @ OSH w/ normal LVSF and mildly reduced RV systolic function.             Elevated LFTs.     - Patient w/ recent admission for septic shock due to presumed ascending cholangitis at OSH     - ERCP was deferred at that time and patient had similar abd US findings of GB distention and intrahepatic biliary ductal dilation.     - Currently patient denies RUQ pain.     - Will hold off CT A/P as patient had recent imaging at OSH    - GI consulted    - MRCP was performed on 11/4 prior to discharge. Please follow up with GI/PCP for results     - LFTs down trended prior to discharge             Type 2 diabetes mellitus without complication, with long-term current use of insulin.      - Patient hypoglycemic to 60 in ED and was started on Lantus 30U qHS by PCP a few days ago. She was previously on Metformin Glipizide and Sitapliptin, which patient states were just recently stopped      - A1c  10.5     - Initially held all long acting insulin due to hypoglycemic event and monitored FS qAC and qHS on low dose ISS    - Then started lower dose of lantus    - Discharge on 10 u of lantus and follow up with PCP on 11/11 for further monitoring and management             Hypothyroidism.     -  TSH 25, FT4 0.64    - increased  Synthroid to 100 mcg.             Essential hypertension.    - BP well controlled at this time    - Given concern for sepsis, held home lisinopril     - Restarted amlodipine w/ hold parameters.            Prophylactic measure.      - DVT ppx: lovenox    - Diet: regular    - PT consult: no needs         Per medical attg, pt is stable for discharge on 11/4/19.             Dispo: home

## 2019-11-04 NOTE — DISCHARGE NOTE PROVIDER - NSDCCAREPROVSEEN_GEN_ALL_CORE_FT
[facility] Medicine ACP, Team  Infectious disease  Gastroenterology  Dr. Lenz

## 2019-11-06 LAB
BACTERIA BLD CULT: SIGNIFICANT CHANGE UP
BACTERIA BLD CULT: SIGNIFICANT CHANGE UP

## 2020-07-09 NOTE — ED ADULT NURSE NOTE - NSFALLRSKOUTCOME_ED_ALL_ED
Called pt and this is a mail order / refilled and pt plans on keeping both appointments  
Patient is calling to request a refill on her norethindrone.  
Universal Safety Interventions

## 2021-01-05 PROBLEM — A41.4: Chronic | Status: ACTIVE | Noted: 2019-11-01

## 2021-01-05 PROBLEM — E11.9 TYPE 2 DIABETES MELLITUS WITHOUT COMPLICATIONS: Chronic | Status: ACTIVE | Noted: 2019-11-01

## 2021-01-05 PROBLEM — E78.5 HYPERLIPIDEMIA, UNSPECIFIED: Chronic | Status: ACTIVE | Noted: 2019-11-01

## 2021-01-05 PROBLEM — K21.9 GASTRO-ESOPHAGEAL REFLUX DISEASE WITHOUT ESOPHAGITIS: Chronic | Status: ACTIVE | Noted: 2019-11-01

## 2021-01-05 PROBLEM — E03.9 HYPOTHYROIDISM, UNSPECIFIED: Chronic | Status: ACTIVE | Noted: 2019-11-01

## 2021-01-05 PROBLEM — I10 ESSENTIAL (PRIMARY) HYPERTENSION: Chronic | Status: ACTIVE | Noted: 2019-11-01

## 2021-01-13 ENCOUNTER — NON-APPOINTMENT (OUTPATIENT)
Age: 81
End: 2021-01-13

## 2021-01-13 ENCOUNTER — APPOINTMENT (OUTPATIENT)
Dept: DERMATOLOGY | Facility: CLINIC | Age: 81
End: 2021-01-13
Payer: MEDICARE

## 2021-01-13 VITALS — WEIGHT: 106 LBS | HEIGHT: 59 IN | BODY MASS INDEX: 21.37 KG/M2

## 2021-01-13 PROCEDURE — 99204 OFFICE O/P NEW MOD 45 MIN: CPT

## 2021-01-13 PROCEDURE — 99072 ADDL SUPL MATRL&STAF TM PHE: CPT

## 2021-02-03 ENCOUNTER — APPOINTMENT (OUTPATIENT)
Dept: DERMATOLOGY | Facility: CLINIC | Age: 81
End: 2021-02-03
Payer: MEDICARE

## 2021-02-03 DIAGNOSIS — L64.9 ANDROGENIC ALOPECIA, UNSPECIFIED: ICD-10-CM

## 2021-02-03 DIAGNOSIS — L28.0 LICHEN SIMPLEX CHRONICUS: ICD-10-CM

## 2021-02-03 PROCEDURE — 99072 ADDL SUPL MATRL&STAF TM PHE: CPT

## 2021-02-03 PROCEDURE — 99214 OFFICE O/P EST MOD 30 MIN: CPT

## 2021-02-03 RX ORDER — BLOOD SUGAR DIAGNOSTIC
STRIP MISCELLANEOUS
Qty: 200 | Refills: 0 | Status: ACTIVE | COMMUNITY
Start: 2020-12-26

## 2021-02-03 RX ORDER — GLIPIZIDE 2.5 MG/1
2.5 TABLET, FILM COATED, EXTENDED RELEASE ORAL
Qty: 90 | Refills: 0 | Status: ACTIVE | COMMUNITY
Start: 2020-12-01

## 2021-02-03 RX ORDER — TRIAMCINOLONE ACETONIDE 1 MG/ML
0.1 LOTION TOPICAL
Qty: 60 | Refills: 0 | Status: DISCONTINUED | COMMUNITY
Start: 2020-12-01 | End: 2021-02-03

## 2021-02-03 RX ORDER — HYDROXYZINE HYDROCHLORIDE 25 MG/1
25 TABLET ORAL
Qty: 30 | Refills: 0 | Status: DISCONTINUED | COMMUNITY
Start: 2021-01-13 | End: 2021-02-03

## 2021-02-03 RX ORDER — DENOSUMAB 60 MG/ML
60 INJECTION SUBCUTANEOUS
Qty: 1 | Refills: 0 | Status: ACTIVE | COMMUNITY
Start: 2021-01-30

## 2021-02-03 RX ORDER — FAMOTIDINE 20 MG/1
20 TABLET, FILM COATED ORAL
Qty: 180 | Refills: 0 | Status: ACTIVE | COMMUNITY
Start: 2020-12-01

## 2021-02-03 RX ORDER — PEN NEEDLE, DIABETIC 29 G X1/2"
32G X 4 MM NEEDLE, DISPOSABLE MISCELLANEOUS
Qty: 100 | Refills: 0 | Status: ACTIVE | COMMUNITY
Start: 2019-12-30

## 2021-02-03 RX ORDER — TRIAMCINOLONE ACETONIDE 0.25 MG/G
0.03 CREAM TOPICAL
Qty: 80 | Refills: 0 | Status: DISCONTINUED | COMMUNITY
Start: 2020-11-22 | End: 2021-02-03

## 2021-02-03 RX ORDER — LEVOTHYROXINE SODIUM 0.09 MG/1
88 TABLET ORAL
Qty: 90 | Refills: 0 | Status: ACTIVE | COMMUNITY
Start: 2021-01-19

## 2021-02-03 RX ORDER — ROSUVASTATIN CALCIUM 10 MG/1
10 TABLET, FILM COATED ORAL
Qty: 90 | Refills: 0 | Status: ACTIVE | COMMUNITY
Start: 2020-12-01

## 2021-02-03 RX ORDER — INSULIN DEGLUDEC INJECTION 100 U/ML
100 INJECTION, SOLUTION SUBCUTANEOUS
Qty: 15 | Refills: 0 | Status: ACTIVE | COMMUNITY
Start: 2021-01-26

## 2021-04-26 ENCOUNTER — APPOINTMENT (OUTPATIENT)
Dept: DERMATOLOGY | Facility: CLINIC | Age: 81
End: 2021-04-26
Payer: MEDICARE

## 2021-04-26 PROCEDURE — 99214 OFFICE O/P EST MOD 30 MIN: CPT

## 2021-04-26 PROCEDURE — 99072 ADDL SUPL MATRL&STAF TM PHE: CPT

## 2021-04-26 RX ORDER — TRIAMCINOLONE ACETONIDE 1 MG/G
0.1 OINTMENT TOPICAL
Qty: 1 | Refills: 0 | Status: ACTIVE | COMMUNITY
Start: 2021-01-13 | End: 1900-01-01

## 2021-08-02 ENCOUNTER — APPOINTMENT (OUTPATIENT)
Dept: DERMATOLOGY | Facility: CLINIC | Age: 81
End: 2021-08-02
Payer: MEDICARE

## 2021-08-02 VITALS — HEIGHT: 59 IN | BODY MASS INDEX: 20.16 KG/M2 | WEIGHT: 100 LBS

## 2021-08-02 DIAGNOSIS — L81.9 DISORDER OF PIGMENTATION, UNSPECIFIED: ICD-10-CM

## 2021-08-02 DIAGNOSIS — L85.3 XEROSIS CUTIS: ICD-10-CM

## 2021-08-02 DIAGNOSIS — L21.9 SEBORRHEIC DERMATITIS, UNSPECIFIED: ICD-10-CM

## 2021-08-02 DIAGNOSIS — L29.9 PRURITUS, UNSPECIFIED: ICD-10-CM

## 2021-08-02 DIAGNOSIS — L20.89 OTHER ATOPIC DERMATITIS: ICD-10-CM

## 2021-08-02 PROCEDURE — 99214 OFFICE O/P EST MOD 30 MIN: CPT

## 2021-08-02 RX ORDER — GLIPIZIDE 5 MG/1
5 TABLET, FILM COATED, EXTENDED RELEASE ORAL
Qty: 90 | Refills: 0 | Status: ACTIVE | COMMUNITY
Start: 2021-03-02

## 2021-08-02 RX ORDER — FLUOCINONIDE 0.5 MG/ML
0.05 SOLUTION TOPICAL
Qty: 1 | Refills: 0 | Status: ACTIVE | COMMUNITY
Start: 2021-01-13 | End: 1900-01-01

## 2021-08-02 RX ORDER — BETAMETHASONE DIPROPIONATE 0.5 MG/G
0.05 OINTMENT, AUGMENTED TOPICAL
Qty: 2 | Refills: 0 | Status: DISCONTINUED | COMMUNITY
Start: 2021-01-13 | End: 2021-08-02

## 2021-10-18 DIAGNOSIS — Z00.00 ENCOUNTER FOR GENERAL ADULT MEDICAL EXAMINATION W/OUT ABNORMAL FINDINGS: ICD-10-CM

## 2021-10-20 ENCOUNTER — APPOINTMENT (OUTPATIENT)
Dept: ORTHOPEDIC SURGERY | Facility: CLINIC | Age: 81
End: 2021-10-20
Payer: MEDICARE

## 2021-10-20 PROCEDURE — 99203 OFFICE O/P NEW LOW 30 MIN: CPT

## 2021-10-20 PROCEDURE — 73564 X-RAY EXAM KNEE 4 OR MORE: CPT | Mod: RT

## 2021-10-20 NOTE — PHYSICAL EXAM
[de-identified] : Patient is well nourished, well-developed, in no acute distress, with appropriate mood and affect. The patient is oriented to time, place, and person. Respirations are even and unlabored. Gait evaluation does reveal a limp. There is no inguinal adenopathy. Examination of the contralateral knee shows normal range of motion, strength, no tenderness, and intact skin. The affected limb is well-perfused, without skin lesions, shows a grossly normal motor and sensory examination. Knee motion is significantly reduced and does cause significant pain. The knee moves from  degrees. The knee is stable within that range-of-motion to AP and ML stress. The alignment of the knee is 10 degrees valgus. Muscle strength is normal. Pedal pulses are palpable. Hip examination was negative.\par  [de-identified] : Long standing knee, AP knee, lateral knee, and patellar views of the right knee were ordered and taken in the office and demonstrate degenerative joint disease of the knee with joint space narrowing, osteophyte formation, and subchondral sclerosis.\par \par The patient) AP and lateral radiograph of the right knee.  These are nonweightbearing x-rays.  These demonstrate right knee osteoarthritis.

## 2021-10-20 NOTE — HISTORY OF PRESENT ILLNESS
[de-identified] : This is very nice 81-year-old female experiencing chronic right knee pain, which is severe in intensity. The pain substantially limits activities of daily living. Walking tolerance is reduced.  She does note take anything more than Tylenol for this.  She likes to try to do things with natural methods.  She has not tried physical therapy which is neutral intra-articular knee cortisone injections.  The patient denies any radiation of the pain to the feet and it is not associated with numbness, tingling, or weakness.

## 2021-10-20 NOTE — DISCUSSION/SUMMARY
[de-identified] : This patient has right knee osteoarthritis.  The patient is not an appropriate candidate for surgical intervention at this time. An extensive discussion was conducted on the natural history of the disease and the variety of surgical and non-surgical options available to the patient including, but not limited to non-steroidal anti-inflammatory medications, steroid injections, physical therapy, maintenance of ideal body weight, and reduction of activity.  I recommended and prescribed a course of Mobic and physical therapy.  The patient is also encouraged to trial a neoprene sleeve knee brace which can be purchased OTC.  The patient is also encouraged to consider use of a cane.\par The patient will schedule an appointment as needed.\par

## 2022-01-06 ENCOUNTER — RX RENEWAL (OUTPATIENT)
Age: 82
End: 2022-01-06

## 2022-01-06 RX ORDER — MELOXICAM 15 MG/1
15 TABLET ORAL
Qty: 30 | Refills: 2 | Status: ACTIVE | COMMUNITY
Start: 2021-10-20 | End: 1900-01-01

## 2022-04-01 ENCOUNTER — RX RENEWAL (OUTPATIENT)
Age: 82
End: 2022-04-01

## 2022-08-02 ENCOUNTER — RX RENEWAL (OUTPATIENT)
Age: 82
End: 2022-08-02

## 2022-09-27 ENCOUNTER — RX RENEWAL (OUTPATIENT)
Age: 82
End: 2022-09-27

## 2022-09-27 RX ORDER — KETOCONAZOLE 20.5 MG/ML
2 SHAMPOO, SUSPENSION TOPICAL
Qty: 120 | Refills: 2 | Status: ACTIVE | COMMUNITY
Start: 2021-04-26 | End: 1900-01-01

## 2023-05-12 ENCOUNTER — EMERGENCY (EMERGENCY)
Facility: HOSPITAL | Age: 83
LOS: 1 days | Discharge: HOME | End: 2023-05-12
Attending: EMERGENCY MEDICINE
Payer: MEDICARE

## 2023-05-12 VITALS
OXYGEN SATURATION: 99 % | DIASTOLIC BLOOD PRESSURE: 69 MMHG | HEIGHT: 58 IN | RESPIRATION RATE: 16 BRPM | TEMPERATURE: 98 F | SYSTOLIC BLOOD PRESSURE: 164 MMHG | WEIGHT: 102.07 LBS | HEART RATE: 65 BPM

## 2023-05-12 VITALS
TEMPERATURE: 98 F | RESPIRATION RATE: 17 BRPM | SYSTOLIC BLOOD PRESSURE: 161 MMHG | OXYGEN SATURATION: 98 % | DIASTOLIC BLOOD PRESSURE: 71 MMHG | HEART RATE: 78 BPM

## 2023-05-12 PROCEDURE — 73610 X-RAY EXAM OF ANKLE: CPT

## 2023-05-12 PROCEDURE — 73610 X-RAY EXAM OF ANKLE: CPT | Mod: 26,RT

## 2023-05-12 PROCEDURE — 99284 EMERGENCY DEPT VISIT MOD MDM: CPT | Mod: 25

## 2023-05-12 PROCEDURE — 73630 X-RAY EXAM OF FOOT: CPT

## 2023-05-12 PROCEDURE — 99284 EMERGENCY DEPT VISIT MOD MDM: CPT

## 2023-05-12 PROCEDURE — 73630 X-RAY EXAM OF FOOT: CPT | Mod: 26,RT

## 2023-05-12 RX ORDER — ACETAMINOPHEN 500 MG
650 TABLET ORAL ONCE
Refills: 0 | Status: COMPLETED | OUTPATIENT
Start: 2023-05-12 | End: 2023-05-12

## 2023-05-12 RX ADMIN — Medication 650 MILLIGRAM(S): at 22:28

## 2023-05-12 NOTE — ED ADULT NURSE NOTE - NSFALLRISKINTERV_ED_ALL_ED

## 2023-05-12 NOTE — ED ADULT NURSE NOTE - NSFALLUNIVINTERV_ED_ALL_ED
Bed/Stretcher in lowest position, wheels locked, appropriate side rails in place/Call bell, personal items and telephone in reach/Instruct patient to call for assistance before getting out of bed/chair/stretcher/Non-slip footwear applied when patient is off stretcher/[place] to call system/Physically safe environment - no spills, clutter or unnecessary equipment/Purposeful proactive rounding/Room/bathroom lighting operational, light cord in reach

## 2023-05-12 NOTE — ED PROVIDER NOTE - NSFOLLOWUPCLINICS_GEN_ALL_ED_FT
[facility] Orthopedic Surgery  Orthopedic Surgery  [address], [address]rd & 4th [place] [address]  Phone: [phone]  Fax:

## 2023-05-12 NOTE — ED PROCEDURE NOTE - NS ED PERI VASCULAR NEG
Mom requesting to keep appointment. Closing encounter.    fingers/toes warm to touch/no paresthesia/no swelling/no cyanosis of extremity/capillary refill time < 2 seconds

## 2023-05-12 NOTE — ED PROVIDER NOTE - NSFOLLOWUPINSTRUCTIONS_ED_ALL_ED_FT
You were seen in the ED for ankle and foot pain.  You have a fracture of your little toe called a pseudo-vo fracture or a proximal 5th metatarsal fracture.   We are wrapping the foot and putting you in a special boot to protect the foot.  Most of these fractures do not need a cast or surgery.  Use a walking boot and follow up with orthopedic surgery.  Return to the ED if pain worsens or new complaints.

## 2023-05-12 NOTE — ED PROVIDER NOTE - OBJECTIVE STATEMENT
82-year-old female with past medical history diabetes, hypothyroidism presenting to emergency department with right ankle pain x4 days.  Patient states she had a mechanical trip while walking downstairs and had inversion of the right ankle.  No other injury, no head trauma, no LOC.  Has been taking Tylenol for pain with mild relief.  Able to ambulate with pain.  Noted swelling to ankle.  No prior ankle or foot related injuries or surgeries.  Denies fever, numbness, tingling.

## 2023-05-12 NOTE — ED PROVIDER NOTE - PATIENT PORTAL LINK FT
You can access the FollowMyHealth Patient Portal offered by [facility] by registering at the following website: [email or web address]. By joining [employer]’s FollowMyHealth portal, you will also be able to view your health information using other applications (apps) compatible with our system.

## 2023-05-12 NOTE — ED PROCEDURE NOTE - NS ED PERI NEURO NEG
October 9, 2019      William Iniguez  Child [facility] [place]  [address]  [facility] [address]  Phone: [phone]  Fax: [phone]       Patient: Girma Bowie    YOB: 2010  Date of Visit: 10/09/2019    To Whom It May Concern:    Maryellen Bowie   was at [facility] on 10/09/2019. He may return to school on 10/10/2019 with no restrictions. If you have any questions or concerns, or if I can be of further assistance, please do not hesitate to contact me.    Sincerely,    Chante Garrett MA     
Pre-application: Motor, sensory, and vascular responses intact in the injured extremity./Post-application: Motor, sensory, and vascular responses intact in the injured extremity./The patient/caregiver verbalized understanding of how to care for the injured extremity with splint

## 2023-05-12 NOTE — ED PROVIDER NOTE - CLINICAL SUMMARY MEDICAL DECISION MAKING FREE TEXT BOX
82 F with inversion injury to Rt ankle after missing a step walking down stairs 4 days ago. No other injury. Has pain and swelling to lateral malleolus and base of 5th metatarsal. Evaluating for ankle sprain v fracture. Will get xrays ankle and foot. Analgesia with tylenol. Will reassess. 82 F with inversion injury to Rt ankle after missing a step walking down stairs 4 days ago. No other injury. Has pain and swelling to lateral malleolus and base of 5th metatarsal. Evaluating for ankle sprain v fracture. Will get xrays ankle and foot. Analgesia with tylenol. Will reassess.  Doron: 81 yo had inversion injury 4 days ago, foot still painful and swollen. no head trauma, no pre-syncope or syncope. Would image and treat pain.

## 2023-05-12 NOTE — ED PROVIDER NOTE - PHYSICAL EXAMINATION
Gen: NAD  HEENT: mucous membranes moist  CV: RRR  Resp: no accessory muscle use, no increased work of breathing  MSK: +soft tissue swelling and tenderness to palpation to Rt lateral malleolus and base of 5th metatarsal. Pain with forced inversion. Full ROM at ankle joint. Neurovascularly intact. No knee tenderness, full ROM at knee joint.   Neuro: following commands, moving all four extremities spontaneously  Psych: appropriate mood

## 2023-05-12 NOTE — ED ADULT NURSE NOTE - OBJECTIVE STATEMENT
81 yo FM AOx4 from home with PMH of HTN, T2DM (on insulin and metformin), hypothyroid, and OA c/o right ankle pain and swelling. Pt states she missed a step up when walking down stairs and twisted right ankle 4 days ago. Pt denies LOC, head strike, or blood thinner use. Pt has been managing pain with Tylenol, last took XS Tylenol at 900. Pt states swelling increased prompting [facility] today. Daughter was not notified of fall by patient until today. PMS and limited ROM noted on right lower extremity. Pt initially presents to [facility] ED in no acute distress with unlabored, clear, equal breath sounds bilaterally from apices to bases. Pt abdomen soft and nondistended. Pt denies c/p, sob, abd pain, N/V/D, fevers, chills, dysuria, blood in urine and stool.

## 2023-05-12 NOTE — ED PROVIDER NOTE - ATTENDING CONTRIBUTION TO CARE
I performed a history and physical exam of the patient and discussed their management with the resident and /or advanced care provider. I reviewed the resident and /or ACP's note and agree with the documented findings and plan of care. My medical decision making and observations are found above.  Lungs clear abd soft, swollen lateral rt foot

## 2023-05-12 NOTE — ED ADULT TRIAGE NOTE - CHIEF COMPLAINT QUOTE
missed a step and twisted right ankle, denies head strike, denies LOC, denies thinners. swelling noted

## 2023-05-18 ENCOUNTER — EMERGENCY (EMERGENCY)
Facility: HOSPITAL | Age: 83
LOS: 1 days | Discharge: HOME | End: 2023-05-18
Attending: EMERGENCY MEDICINE | Admitting: EMERGENCY MEDICINE
Payer: MEDICARE

## 2023-05-18 VITALS
OXYGEN SATURATION: 100 % | DIASTOLIC BLOOD PRESSURE: 51 MMHG | HEART RATE: 65 BPM | TEMPERATURE: 98 F | SYSTOLIC BLOOD PRESSURE: 148 MMHG | RESPIRATION RATE: 17 BRPM

## 2023-05-18 VITALS
RESPIRATION RATE: 16 BRPM | HEART RATE: 67 BPM | SYSTOLIC BLOOD PRESSURE: 152 MMHG | DIASTOLIC BLOOD PRESSURE: 49 MMHG | OXYGEN SATURATION: 100 % | TEMPERATURE: 98 F

## 2023-05-18 LAB
ALBUMIN SERPL ELPH-MCNC: 4.6 G/DL — SIGNIFICANT CHANGE UP (ref 3.3–5)
ALP SERPL-CCNC: 101 U/L — SIGNIFICANT CHANGE UP (ref 40–120)
ALT FLD-CCNC: 11 U/L — SIGNIFICANT CHANGE UP (ref 4–33)
ANION GAP SERPL CALC-SCNC: 12 MMOL/L — SIGNIFICANT CHANGE UP (ref 7–14)
APPEARANCE UR: CLEAR — SIGNIFICANT CHANGE UP
AST SERPL-CCNC: 28 U/L — SIGNIFICANT CHANGE UP (ref 4–32)
B PERT DNA SPEC QL NAA+PROBE: SIGNIFICANT CHANGE UP
B PERT+PARAPERT DNA PNL SPEC NAA+PROBE: SIGNIFICANT CHANGE UP
BASE EXCESS BLDV CALC-SCNC: 5.2 MMOL/L — HIGH (ref -2–3)
BASOPHILS # BLD AUTO: 0.06 K/UL — SIGNIFICANT CHANGE UP (ref 0–0.2)
BASOPHILS NFR BLD AUTO: 0.6 % — SIGNIFICANT CHANGE UP (ref 0–2)
BILIRUB SERPL-MCNC: 0.8 MG/DL — SIGNIFICANT CHANGE UP (ref 0.2–1.2)
BILIRUB UR-MCNC: NEGATIVE — SIGNIFICANT CHANGE UP
BLOOD GAS VENOUS COMPREHENSIVE RESULT: SIGNIFICANT CHANGE UP
BORDETELLA PARAPERTUSSIS (RAPRVP): SIGNIFICANT CHANGE UP
BUN SERPL-MCNC: 13 MG/DL — SIGNIFICANT CHANGE UP (ref 7–23)
C PNEUM DNA SPEC QL NAA+PROBE: SIGNIFICANT CHANGE UP
CALCIUM SERPL-MCNC: 10 MG/DL — SIGNIFICANT CHANGE UP (ref 8.4–10.5)
CHLORIDE BLDV-SCNC: 95 MMOL/L — LOW (ref 96–108)
CHLORIDE SERPL-SCNC: 92 MMOL/L — LOW (ref 98–107)
CO2 BLDV-SCNC: 33.5 MMOL/L — HIGH (ref 22–26)
CO2 SERPL-SCNC: 26 MMOL/L — SIGNIFICANT CHANGE UP (ref 22–31)
COLOR SPEC: COLORLESS — SIGNIFICANT CHANGE UP
CREAT SERPL-MCNC: 0.76 MG/DL — SIGNIFICANT CHANGE UP (ref 0.5–1.3)
DIFF PNL FLD: NEGATIVE — SIGNIFICANT CHANGE UP
EGFR: 77 ML/MIN/1.73M2 — SIGNIFICANT CHANGE UP
EOSINOPHIL # BLD AUTO: 0.24 K/UL — SIGNIFICANT CHANGE UP (ref 0–0.5)
EOSINOPHIL NFR BLD AUTO: 2.6 % — SIGNIFICANT CHANGE UP (ref 0–6)
FLUAV SUBTYP SPEC NAA+PROBE: SIGNIFICANT CHANGE UP
FLUBV RNA SPEC QL NAA+PROBE: SIGNIFICANT CHANGE UP
GAS PNL BLDV: 126 MMOL/L — LOW (ref 136–145)
GLUCOSE BLDV-MCNC: 123 MG/DL — HIGH (ref 70–99)
GLUCOSE SERPL-MCNC: 112 MG/DL — HIGH (ref 70–99)
GLUCOSE UR QL: NEGATIVE — SIGNIFICANT CHANGE UP
HADV DNA SPEC QL NAA+PROBE: SIGNIFICANT CHANGE UP
HCO3 BLDV-SCNC: 32 MMOL/L — HIGH (ref 22–29)
HCOV 229E RNA SPEC QL NAA+PROBE: SIGNIFICANT CHANGE UP
HCOV HKU1 RNA SPEC QL NAA+PROBE: SIGNIFICANT CHANGE UP
HCOV NL63 RNA SPEC QL NAA+PROBE: SIGNIFICANT CHANGE UP
HCOV OC43 RNA SPEC QL NAA+PROBE: SIGNIFICANT CHANGE UP
HCT VFR BLD CALC: 36.2 % — SIGNIFICANT CHANGE UP (ref 34.5–45)
HCT VFR BLDA CALC: 37 % — SIGNIFICANT CHANGE UP (ref 34.5–46.5)
HGB BLD CALC-MCNC: 12.3 G/DL — SIGNIFICANT CHANGE UP (ref 11.7–16.1)
HGB BLD-MCNC: 11.7 G/DL — SIGNIFICANT CHANGE UP (ref 11.5–15.5)
HMPV RNA SPEC QL NAA+PROBE: SIGNIFICANT CHANGE UP
HPIV1 RNA SPEC QL NAA+PROBE: SIGNIFICANT CHANGE UP
HPIV2 RNA SPEC QL NAA+PROBE: SIGNIFICANT CHANGE UP
HPIV3 RNA SPEC QL NAA+PROBE: SIGNIFICANT CHANGE UP
HPIV4 RNA SPEC QL NAA+PROBE: SIGNIFICANT CHANGE UP
IANC: 7.36 K/UL — SIGNIFICANT CHANGE UP (ref 1.8–7.4)
IMM GRANULOCYTES NFR BLD AUTO: 0.2 % — SIGNIFICANT CHANGE UP (ref 0–0.9)
KETONES UR-MCNC: NEGATIVE — SIGNIFICANT CHANGE UP
LACTATE BLDV-MCNC: 1.8 MMOL/L — SIGNIFICANT CHANGE UP (ref 0.5–2)
LEUKOCYTE ESTERASE UR-ACNC: NEGATIVE — SIGNIFICANT CHANGE UP
LYMPHOCYTES # BLD AUTO: 1.05 K/UL — SIGNIFICANT CHANGE UP (ref 1–3.3)
LYMPHOCYTES # BLD AUTO: 11.2 % — LOW (ref 13–44)
M PNEUMO DNA SPEC QL NAA+PROBE: SIGNIFICANT CHANGE UP
MAGNESIUM SERPL-MCNC: 1.9 MG/DL — SIGNIFICANT CHANGE UP (ref 1.6–2.6)
MCHC RBC-ENTMCNC: 27.1 PG — SIGNIFICANT CHANGE UP (ref 27–34)
MCHC RBC-ENTMCNC: 32.3 GM/DL — SIGNIFICANT CHANGE UP (ref 32–36)
MCV RBC AUTO: 83.8 FL — SIGNIFICANT CHANGE UP (ref 80–100)
MONOCYTES # BLD AUTO: 0.65 K/UL — SIGNIFICANT CHANGE UP (ref 0–0.9)
MONOCYTES NFR BLD AUTO: 6.9 % — SIGNIFICANT CHANGE UP (ref 2–14)
NEUTROPHILS # BLD AUTO: 7.36 K/UL — SIGNIFICANT CHANGE UP (ref 1.8–7.4)
NEUTROPHILS NFR BLD AUTO: 78.5 % — HIGH (ref 43–77)
NITRITE UR-MCNC: NEGATIVE — SIGNIFICANT CHANGE UP
NRBC # BLD: 0 /100 WBCS — SIGNIFICANT CHANGE UP (ref 0–0)
NRBC # FLD: 0 K/UL — SIGNIFICANT CHANGE UP (ref 0–0)
PCO2 BLDV: 55 MMHG — HIGH (ref 39–52)
PH BLDV: 7.37 — SIGNIFICANT CHANGE UP (ref 7.32–7.43)
PH UR: 7.5 — SIGNIFICANT CHANGE UP (ref 5–8)
PLATELET # BLD AUTO: 260 K/UL — SIGNIFICANT CHANGE UP (ref 150–400)
PO2 BLDV: 22 MMHG — LOW (ref 25–45)
POTASSIUM BLDV-SCNC: 5 MMOL/L — SIGNIFICANT CHANGE UP (ref 3.5–5.1)
POTASSIUM SERPL-MCNC: 4.9 MMOL/L — SIGNIFICANT CHANGE UP (ref 3.5–5.3)
POTASSIUM SERPL-SCNC: 4.9 MMOL/L — SIGNIFICANT CHANGE UP (ref 3.5–5.3)
PROT SERPL-MCNC: 7.6 G/DL — SIGNIFICANT CHANGE UP (ref 6–8.3)
PROT UR-MCNC: ABNORMAL
RAPID RVP RESULT: SIGNIFICANT CHANGE UP
RBC # BLD: 4.32 M/UL — SIGNIFICANT CHANGE UP (ref 3.8–5.2)
RBC # FLD: 13.6 % — SIGNIFICANT CHANGE UP (ref 10.3–14.5)
RSV RNA SPEC QL NAA+PROBE: SIGNIFICANT CHANGE UP
RV+EV RNA SPEC QL NAA+PROBE: SIGNIFICANT CHANGE UP
SAO2 % BLDV: 30.5 % — LOW (ref 67–88)
SARS-COV-2 RNA SPEC QL NAA+PROBE: SIGNIFICANT CHANGE UP
SODIUM SERPL-SCNC: 130 MMOL/L — LOW (ref 135–145)
SP GR SPEC: 1.01 — LOW (ref 1.01–1.05)
UROBILINOGEN FLD QL: SIGNIFICANT CHANGE UP
WBC # BLD: 9.38 K/UL — SIGNIFICANT CHANGE UP (ref 3.8–10.5)
WBC # FLD AUTO: 9.38 K/UL — SIGNIFICANT CHANGE UP (ref 3.8–10.5)

## 2023-05-18 PROCEDURE — 99285 EMERGENCY DEPT VISIT HI MDM: CPT

## 2023-05-18 PROCEDURE — 93010 ELECTROCARDIOGRAM REPORT: CPT

## 2023-05-18 PROCEDURE — 71046 X-RAY EXAM CHEST 2 VIEWS: CPT | Mod: 26

## 2023-05-18 RX ORDER — SODIUM CHLORIDE 9 MG/ML
500 INJECTION INTRAMUSCULAR; INTRAVENOUS; SUBCUTANEOUS ONCE
Refills: 0 | Status: COMPLETED | OUTPATIENT
Start: 2023-05-18 | End: 2023-05-18

## 2023-05-18 RX ADMIN — SODIUM CHLORIDE 500 MILLILITER(S): 9 INJECTION INTRAMUSCULAR; INTRAVENOUS; SUBCUTANEOUS at 06:51

## 2023-05-18 NOTE — ED PROVIDER NOTE - NSFOLLOWUPINSTRUCTIONS_ED_ALL_ED_FT
There were no signs of an emergency medical condition on completion of today's workup.  You will need further medical care and evaluation. A presumptive diagnosis has been made, however further evaluation may be required by your primary care doctor or specialist for a definitive diagnosis.      Follow up with your medical doctor in 2-3 days or call our clinic at [phone] and state you were seen in the Emergency Department and would like to be seen in clinic. You may also call [phone] to speak with a representative to assist follow up care with medicine, surgery, or specialists.    If you are having pain, take Tylenol/acetaminophen 1 g every six hours and supplement (if allowed by your physician, and if you're not having gastric/gastrointestinal/stomach/intestinal problems) with ibuprofen 600 mg, with food or milk/maalox, every six hours which can be taken three hours apart from the Tylenol to have a layered effect.     Be sure to take no more than 4000mg or 4g of Tylenol/acetaminophen in a 24 hour period. Be sure to check your other medications to see if they include Tylenol/acetaminophen and include them in your calculations to ensure you do not take more than 4000mg or 4g of Tylenol/acetaminophen a day.    Drink at least 2 Liters or 64 Ounces of water each day (UNLESS you are supposed to restrict fluids or have a history of congestive heart failure (CHF)).    Return for any persistent, worsening symptoms, or ANY concerns at all.

## 2023-05-18 NOTE — ED PROVIDER NOTE - NSICDXPASTMEDICALHX_GEN_ALL_CORE_FT
PAST MEDICAL HISTORY:  GERD (gastroesophageal reflux disease)     HLD (hyperlipidemia)     Hypertension     Hypothyroidism     Sepsis due to Klebsiella     Type 2 diabetes mellitus

## 2023-05-18 NOTE — ED PROVIDER NOTE - PATIENT PORTAL LINK FT
You can access the FollowMyHealth Patient Portal offered by [facility] by registering at the following website: [email or web address]. By joining [facility]’s FollowMyHealth portal, you will also be able to view your health information using other applications (apps) compatible with our system.

## 2023-05-18 NOTE — ED ADULT NURSE NOTE - OBJECTIVE STATEMENT
Pt presents to RM 12 AxOx4 presenting with "Weakness and shaking" pt states she woke up because she was shaking and checked her FS and was 200s. EMS was called, no LOC. Pt appears well during interview, respirations even and unlabored. Pt able to ambulate well with minimal assist, cont x2. R20G placed labs drawn and sent. Safety maintained, stretcher in lowest position.

## 2023-05-18 NOTE — ED PROVIDER NOTE - PROGRESS NOTE DETAILS
Una Gill MD; [facility] PGY3: Patient reassessed, TSH within normal limits, patient feels comfortable going home with close PCP follow up. Advised patient to make appt within the week. Expressed understanding, will discharge.

## 2023-05-18 NOTE — ED PROVIDER NOTE - ATTENDING CONTRIBUTION TO CARE
DR. GUERRERO, ATTENDING MD-  I performed a face to face bedside interview with the patient regarding history of present illness, review of symptoms and past medical history. I completed an independent physical exam.  I have discussed the patient's plan of care with the resident.   Documentation as above in the note.    81 y/o female h/o dm2 htn hypothyroid woke up with episode of tremulous jaw and generalized weakness.  States bp high at that time.  Now feels at [facility], no complaints.  Eval for anemia lyte abn atypical acs thyroid dysfunction occult infection.  Obtain cbc cmp trop tsh ekg cxr ua ucx give ivf bolus reassess.

## 2023-05-18 NOTE — ED ADULT NURSE NOTE - NSFALLRISKINTERV_ED_ALL_ED

## 2023-05-18 NOTE — ED PROVIDER NOTE - PHYSICAL EXAMINATION
CONSTITUTIONAL: Well-developed; well-nourished; in no acute distress.   SKIN: warm, dry  HEAD: Normocephalic; atraumatic.  EYES: no conjunctival injection. PERRL.   ENT: No nasal discharge; airway clear.  NECK: Supple; non tender.  CARD: S1, S2 normal; Regular rate and rhythm.   RESP: No wheezes, rales or rhonchi. Good air movement bilaterally.   ABD: soft ntnd, no guarding, no distention, no rigidity.   EXT: +ecchymosis R distal foot (2/2 fx recently seen at [facility] for)   NEURO: AOx3  PSYCH: Cooperative, appropriate.

## 2023-05-18 NOTE — ED PROVIDER NOTE - OBJECTIVE STATEMENT
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 States she woke up and noted her FS to be in 200s which is higher than normal. Pt also endorses dry cough for 1 month. Pt denies n/v, cp, sob, urinary sx or new abd pain. Mellisa DO PGY-3:   81 y/o F w/ hx of DM HTN HLD GERD, CVA (no residuals) hypothyroidism and hx of cholangitis in 2019 p/w generalized weakness and noted her jaw to be "shaking". States she woke up and noted her finger stick to be in 200s which is higher than normal. Pt also endorses dry cough for 1 month. Pt denies n/v, cp, sob, urinary sx or new abd pain.

## 2023-05-18 NOTE — ED ADULT NURSE NOTE - NSFALLLASTSIX_ED_ALL_ED
Yes. Debridement Text: The wound edges were debrided prior to proceeding with the closure to facilitate wound healing.

## 2023-05-18 NOTE — ED ADULT TRIAGE NOTE - CHIEF COMPLAINT QUOTE
Pt c/o generalized weakness x 2  hours ago. Endorses cough. Denies fever, chills, urinary symptoms, poor po intake. Hx: DM2,GERD, Sepsis

## 2023-05-18 NOTE — ED PROVIDER NOTE - CLINICAL SUMMARY MEDICAL DECISION MAKING FREE TEXT BOX
Mellisa DO PGY-3:  81 y/o F w/ hx of DM HTN HLD GERD, CVA (no residuals) hypothyroidism and hx of cholangitis in 2019 p/w weakness and noted her jaw to be shaking. DDx include but not limited to: viral syndrome vs pna vs other infectious etiology such as UTI. Will screen for electrolyte abnormalities. Will eval for dka. Will reassess. Dispo pending workup.

## 2023-05-19 LAB
CULTURE RESULTS: SIGNIFICANT CHANGE UP
SPECIMEN SOURCE: SIGNIFICANT CHANGE UP

## 2023-08-06 NOTE — DISCHARGE NOTE NURSING/CASE MANAGEMENT/SOCIAL WORK - NSTOBACCONEVERSMOKERY/N_GEN_A
CPS worker Mriacle Levin here. States that Isela Hoff ( 80, ph [phone], address [address], [address]) (not the patient's father but the father of one of his siblings) will be granted emergency custody of the pt and will be picking him up and keeping him until arrangements can be made to get the child to his maternal grandmother in [place].       Ruth Chirinos RN  [phone]
EDWARDO CALDERA) IN ROOM WITH PATIENT.    PATIENT EATING CEREAL AND WATCHING TV     Korey Samuel RN  08/06/23 [id number]
Lissette Rodriguez here to pick pt up. Pt napping prior to discharge.       Phill Diaz, RN  08/06/23 [address] RN  08/06/23 4061
Pt taken for stroller walk. Interacting with nurses, babbling, laughing, eating snacks without difficulty.      Phill Diaz RN  08/06/23 1500
No

## 2023-08-11 ENCOUNTER — INPATIENT (INPATIENT)
Facility: HOSPITAL | Age: 83
LOS: 0 days | Discharge: HOME | End: 2023-08-12
Attending: INTERNAL MEDICINE | Admitting: INTERNAL MEDICINE
Payer: MEDICARE

## 2023-08-11 VITALS
DIASTOLIC BLOOD PRESSURE: 71 MMHG | OXYGEN SATURATION: 98 % | TEMPERATURE: 97 F | HEIGHT: 59 IN | HEART RATE: 75 BPM | WEIGHT: 98.11 LBS | SYSTOLIC BLOOD PRESSURE: 121 MMHG | RESPIRATION RATE: 18 BRPM

## 2023-08-11 DIAGNOSIS — Z98.49 CATARACT EXTRACTION STATUS, UNSPECIFIED EYE: Chronic | ICD-10-CM

## 2023-08-11 DIAGNOSIS — R94.31 ABNORMAL ELECTROCARDIOGRAM [ECG] [EKG]: ICD-10-CM

## 2023-08-11 LAB
ANION GAP SERPL CALC-SCNC: 11 MMOL/L — SIGNIFICANT CHANGE UP (ref 7–14)
BUN SERPL-MCNC: 21 MG/DL — SIGNIFICANT CHANGE UP (ref 7–23)
CALCIUM SERPL-MCNC: 9.6 MG/DL — SIGNIFICANT CHANGE UP (ref 8.4–10.5)
CHLORIDE SERPL-SCNC: 98 MMOL/L — SIGNIFICANT CHANGE UP (ref 98–107)
CO2 SERPL-SCNC: 26 MMOL/L — SIGNIFICANT CHANGE UP (ref 22–31)
CREAT SERPL-MCNC: 0.85 MG/DL — SIGNIFICANT CHANGE UP (ref 0.5–1.3)
EGFR: 67 ML/MIN/1.73M2 — SIGNIFICANT CHANGE UP
GLUCOSE SERPL-MCNC: 108 MG/DL — HIGH (ref 70–99)
HCT VFR BLD CALC: 39.4 % — SIGNIFICANT CHANGE UP (ref 34.5–45)
HGB BLD-MCNC: 12.8 G/DL — SIGNIFICANT CHANGE UP (ref 11.5–15.5)
MCHC RBC-ENTMCNC: 28.3 PG — SIGNIFICANT CHANGE UP (ref 27–34)
MCHC RBC-ENTMCNC: 32.5 GM/DL — SIGNIFICANT CHANGE UP (ref 32–36)
MCV RBC AUTO: 87 FL — SIGNIFICANT CHANGE UP (ref 80–100)
NRBC # BLD: 0 /100 WBCS — SIGNIFICANT CHANGE UP (ref 0–0)
NRBC # FLD: 0 K/UL — SIGNIFICANT CHANGE UP (ref 0–0)
PLATELET # BLD AUTO: 226 K/UL — SIGNIFICANT CHANGE UP (ref 150–400)
POTASSIUM SERPL-MCNC: 4.3 MMOL/L — SIGNIFICANT CHANGE UP (ref 3.5–5.3)
POTASSIUM SERPL-SCNC: 4.3 MMOL/L — SIGNIFICANT CHANGE UP (ref 3.5–5.3)
RBC # BLD: 4.53 M/UL — SIGNIFICANT CHANGE UP (ref 3.8–5.2)
RBC # FLD: 13.2 % — SIGNIFICANT CHANGE UP (ref 10.3–14.5)
SODIUM SERPL-SCNC: 135 MMOL/L — SIGNIFICANT CHANGE UP (ref 135–145)
WBC # BLD: 6.12 K/UL — SIGNIFICANT CHANGE UP (ref 3.8–10.5)
WBC # FLD AUTO: 6.12 K/UL — SIGNIFICANT CHANGE UP (ref 3.8–10.5)

## 2023-08-11 PROCEDURE — 93571 IV DOP VEL&/PRESS C FLO 1ST: CPT | Mod: 26,LD

## 2023-08-11 PROCEDURE — 93010 ELECTROCARDIOGRAM REPORT: CPT | Mod: 76

## 2023-08-11 PROCEDURE — 92928 PRQ TCAT PLMT NTRAC ST 1 LES: CPT | Mod: LD

## 2023-08-11 PROCEDURE — 99152 MOD SED SAME PHYS/QHP 5/>YRS: CPT

## 2023-08-11 PROCEDURE — 93458 L HRT ARTERY/VENTRICLE ANGIO: CPT | Mod: 26,59

## 2023-08-11 RX ORDER — SODIUM CHLORIDE 9 MG/ML
1000 INJECTION, SOLUTION INTRAVENOUS
Refills: 0 | Status: DISCONTINUED | OUTPATIENT
Start: 2023-08-11 | End: 2023-08-12

## 2023-08-11 RX ORDER — SODIUM CHLORIDE 9 MG/ML
250 INJECTION INTRAMUSCULAR; INTRAVENOUS; SUBCUTANEOUS ONCE
Refills: 0 | Status: COMPLETED | OUTPATIENT
Start: 2023-08-11 | End: 2023-08-11

## 2023-08-11 RX ORDER — CLOPIDOGREL BISULFATE 75 MG/1
75 TABLET, FILM COATED ORAL DAILY
Refills: 0 | Status: DISCONTINUED | OUTPATIENT
Start: 2023-08-12 | End: 2023-08-12

## 2023-08-11 RX ORDER — ASPIRIN/CALCIUM CARB/MAGNESIUM 324 MG
81 TABLET ORAL DAILY
Refills: 0 | Status: DISCONTINUED | OUTPATIENT
Start: 2023-08-12 | End: 2023-08-12

## 2023-08-11 RX ORDER — OMEGA-3 ACID ETHYL ESTERS 1 G
1 CAPSULE ORAL
Qty: 0 | Refills: 0 | DISCHARGE

## 2023-08-11 RX ORDER — ROSUVASTATIN CALCIUM 5 MG/1
1 TABLET ORAL
Refills: 0 | DISCHARGE

## 2023-08-11 RX ORDER — ASPIRIN/CALCIUM CARB/MAGNESIUM 324 MG
1 TABLET ORAL
Qty: 0 | Refills: 0 | DISCHARGE

## 2023-08-11 RX ORDER — FAMOTIDINE 10 MG/ML
1 INJECTION INTRAVENOUS
Qty: 0 | Refills: 0 | DISCHARGE

## 2023-08-11 RX ORDER — ATORVASTATIN CALCIUM 80 MG/1
40 TABLET, FILM COATED ORAL AT BEDTIME
Refills: 0 | Status: DISCONTINUED | OUTPATIENT
Start: 2023-08-11 | End: 2023-08-12

## 2023-08-11 RX ORDER — DENOSUMAB 60 MG/ML
1 INJECTION SUBCUTANEOUS
Qty: 0 | Refills: 0 | DISCHARGE

## 2023-08-11 RX ORDER — ROSUVASTATIN CALCIUM 5 MG/1
1 TABLET ORAL
Qty: 0 | Refills: 0 | DISCHARGE

## 2023-08-11 RX ORDER — CALCIUM CARBONATE 500(1250)
1 TABLET ORAL
Qty: 0 | Refills: 0 | DISCHARGE

## 2023-08-11 RX ORDER — DEXTROSE 50 % IN WATER 50 %
15 SYRINGE (ML) INTRAVENOUS ONCE
Refills: 0 | Status: DISCONTINUED | OUTPATIENT
Start: 2023-08-11 | End: 2023-08-12

## 2023-08-11 RX ORDER — LISINOPRIL 2.5 MG/1
1 TABLET ORAL
Refills: 0 | DISCHARGE

## 2023-08-11 RX ORDER — CHOLECALCIFEROL (VITAMIN D3) 125 MCG
1 CAPSULE ORAL
Qty: 0 | Refills: 0 | DISCHARGE

## 2023-08-11 RX ORDER — INSULIN DEGLUDEC 100 U/ML
10 INJECTION, SOLUTION SUBCUTANEOUS
Refills: 0 | DISCHARGE

## 2023-08-11 RX ORDER — INSULIN LISPRO 100/ML
VIAL (ML) SUBCUTANEOUS
Refills: 0 | Status: DISCONTINUED | OUTPATIENT
Start: 2023-08-11 | End: 2023-08-12

## 2023-08-11 RX ORDER — SODIUM CHLORIDE 9 MG/ML
1000 INJECTION INTRAMUSCULAR; INTRAVENOUS; SUBCUTANEOUS
Refills: 0 | Status: DISCONTINUED | OUTPATIENT
Start: 2023-08-11 | End: 2023-08-12

## 2023-08-11 RX ORDER — AMLODIPINE BESYLATE 2.5 MG/1
10 TABLET ORAL DAILY
Refills: 0 | Status: DISCONTINUED | OUTPATIENT
Start: 2023-08-12 | End: 2023-08-12

## 2023-08-11 RX ORDER — PREGABALIN 225 MG/1
1 CAPSULE ORAL
Qty: 0 | Refills: 0 | DISCHARGE

## 2023-08-11 RX ORDER — LEVOTHYROXINE SODIUM 125 MCG
1 TABLET ORAL
Refills: 0 | DISCHARGE

## 2023-08-11 RX ORDER — ACETAMINOPHEN 500 MG
2 TABLET ORAL
Qty: 0 | Refills: 0 | DISCHARGE

## 2023-08-11 RX ORDER — GLUCAGON INJECTION, SOLUTION 0.5 MG/.1ML
1 INJECTION, SOLUTION SUBCUTANEOUS ONCE
Refills: 0 | Status: DISCONTINUED | OUTPATIENT
Start: 2023-08-11 | End: 2023-08-12

## 2023-08-11 RX ORDER — AMLODIPINE BESYLATE 2.5 MG/1
1 TABLET ORAL
Refills: 0 | DISCHARGE

## 2023-08-11 RX ORDER — FAMOTIDINE 10 MG/ML
20 INJECTION INTRAVENOUS DAILY
Refills: 0 | Status: DISCONTINUED | OUTPATIENT
Start: 2023-08-11 | End: 2023-08-12

## 2023-08-11 RX ORDER — AMLODIPINE BESYLATE 2.5 MG/1
1 TABLET ORAL
Qty: 0 | Refills: 0 | DISCHARGE

## 2023-08-11 RX ORDER — DEXTROSE 50 % IN WATER 50 %
25 SYRINGE (ML) INTRAVENOUS ONCE
Refills: 0 | Status: DISCONTINUED | OUTPATIENT
Start: 2023-08-11 | End: 2023-08-12

## 2023-08-11 RX ORDER — ACETAMINOPHEN 500 MG
650 TABLET ORAL ONCE
Refills: 0 | Status: COMPLETED | OUTPATIENT
Start: 2023-08-11 | End: 2023-08-11

## 2023-08-11 RX ORDER — LISINOPRIL 2.5 MG/1
20 TABLET ORAL DAILY
Refills: 0 | Status: DISCONTINUED | OUTPATIENT
Start: 2023-08-11 | End: 2023-08-12

## 2023-08-11 RX ORDER — FERROUS SULFATE 325(65) MG
1 TABLET ORAL
Qty: 0 | Refills: 0 | DISCHARGE

## 2023-08-11 RX ORDER — LEVOTHYROXINE SODIUM 125 MCG
75 TABLET ORAL DAILY
Refills: 0 | Status: DISCONTINUED | OUTPATIENT
Start: 2023-08-11 | End: 2023-08-12

## 2023-08-11 RX ORDER — FAMOTIDINE 10 MG/ML
1 INJECTION INTRAVENOUS
Refills: 0 | DISCHARGE

## 2023-08-11 RX ORDER — SODIUM CHLORIDE 9 MG/ML
3 INJECTION INTRAMUSCULAR; INTRAVENOUS; SUBCUTANEOUS EVERY 8 HOURS
Refills: 0 | Status: DISCONTINUED | OUTPATIENT
Start: 2023-08-11 | End: 2023-08-12

## 2023-08-11 RX ORDER — DEXTROSE 50 % IN WATER 50 %
12.5 SYRINGE (ML) INTRAVENOUS ONCE
Refills: 0 | Status: DISCONTINUED | OUTPATIENT
Start: 2023-08-11 | End: 2023-08-12

## 2023-08-11 RX ADMIN — FAMOTIDINE 20 MILLIGRAM(S): 10 INJECTION INTRAVENOUS at 17:36

## 2023-08-11 RX ADMIN — SODIUM CHLORIDE 75 MILLILITER(S): 9 INJECTION INTRAMUSCULAR; INTRAVENOUS; SUBCUTANEOUS at 07:54

## 2023-08-11 RX ADMIN — SODIUM CHLORIDE 1000 MILLILITER(S): 9 INJECTION INTRAMUSCULAR; INTRAVENOUS; SUBCUTANEOUS at 07:28

## 2023-08-11 RX ADMIN — Medication 1: at 12:10

## 2023-08-11 RX ADMIN — SODIUM CHLORIDE 3 MILLILITER(S): 9 INJECTION INTRAMUSCULAR; INTRAVENOUS; SUBCUTANEOUS at 21:52

## 2023-08-11 RX ADMIN — Medication 650 MILLIGRAM(S): at 10:54

## 2023-08-11 RX ADMIN — Medication 650 MILLIGRAM(S): at 11:39

## 2023-08-11 RX ADMIN — ATORVASTATIN CALCIUM 40 MILLIGRAM(S): 80 TABLET, FILM COATED ORAL at 21:54

## 2023-08-11 RX ADMIN — SODIUM CHLORIDE 3 MILLILITER(S): 9 INJECTION INTRAMUSCULAR; INTRAVENOUS; SUBCUTANEOUS at 15:21

## 2023-08-11 NOTE — H&P CARDIOLOGY - TIME:
Patient evaluated by Ashley Briggs. Patient accepted for admission. Anticipate transfer sometime later this morning. 07:27

## 2023-08-11 NOTE — PATIENT PROFILE ADULT - FALL HARM RISK - HARM RISK INTERVENTIONS

## 2023-08-11 NOTE — H&P CARDIOLOGY - NSICDXPASTMEDICALHX_GEN_ALL_CORE_FT
PAST MEDICAL HISTORY:  Diabetes     GERD (gastroesophageal reflux disease)     HLD (hyperlipidemia)     Hypertension     Hyperthyroidism     Hypothyroidism     Sepsis due to Klebsiella     Type 2 diabetes mellitus

## 2023-08-11 NOTE — H&P CARDIOLOGY - HISTORY OF PRESENT ILLNESS
This is a  82 yo Female with a PMH of DM, HTN, HLD, GERD, CVA (no residuals),  hypothyroidism  presented today for elective coronary angiogram. The patient had an abnormal  Nuclear Stress Test.  In light of pts risk factors and abnormal NST the patient was recommended cardiac catheterization with possible intervention if clinically indicated. The process of the procedures along with the risk and benefits for the procedure were explained in detail which included but not limited to bleeding, hematoma, infection, stroke, plural effusion, cardiac tamponade,renal failure, intubation, and death.Patient expressed understanding an all questions were answered.  The patient denies chest pain, palpitations, dizziness, presyncope, syncope,  headache, visual disturbances, CVA, PE, DVT, FUNMI, abdominal pain, N/V/D/C, hematochezia, melena, dysuria, hematuria, fever, chills, ulcers, b/l lower extremities edema. This is a  82 yo Female with a PMH of DM, HTN, HLD, GERD, CVA (no residuals),  hypothyroidism  presented today for elective coronary angiogram. The patient had an abnormal  Nuclear Stress Test.  In light of pts risk factors and abnormal NST the patient was recommended cardiac catheterization with possible intervention if clinically indicated. The process of the procedures along with the risk and benefits for the procedure were explained in detail which included but not limited to bleeding, hematoma, infection, stroke, plural effusion, cardiac tamponade,renal failure, intubation, and death.Patient expressed understanding an all questions were answered.  The patient  admits to feeling more fatigue/SOB when walking 3-4blocks. stable over the last 3 months

## 2023-08-11 NOTE — H&P CARDIOLOGY - REVIEW OF SYSTEMS
patient denies chest pain, palpitations, dizziness, presyncope, syncope,  headache, visual disturbances, CVA, PE, DVT, FUNMI, abdominal pain, N/V/D/C, hematochezia, melena, dysuria, hematuria, fever, chills, ulcers, b/l lower extremities edema.

## 2023-08-12 ENCOUNTER — TRANSCRIPTION ENCOUNTER (OUTPATIENT)
Age: 83
End: 2023-08-12

## 2023-08-12 VITALS
HEART RATE: 64 BPM | DIASTOLIC BLOOD PRESSURE: 68 MMHG | RESPIRATION RATE: 18 BRPM | OXYGEN SATURATION: 98 % | TEMPERATURE: 98 F | SYSTOLIC BLOOD PRESSURE: 138 MMHG

## 2023-08-12 LAB
A1C WITH ESTIMATED AVERAGE GLUCOSE RESULT: 8.2 % — HIGH (ref 4–5.6)
ALBUMIN SERPL ELPH-MCNC: 4.5 G/DL — SIGNIFICANT CHANGE UP (ref 3.3–5)
ALP SERPL-CCNC: 94 U/L — SIGNIFICANT CHANGE UP (ref 40–120)
ALT FLD-CCNC: 14 U/L — SIGNIFICANT CHANGE UP (ref 4–33)
ANION GAP SERPL CALC-SCNC: 16 MMOL/L — HIGH (ref 7–14)
AST SERPL-CCNC: 24 U/L — SIGNIFICANT CHANGE UP (ref 4–32)
BASOPHILS # BLD AUTO: 0.07 K/UL — SIGNIFICANT CHANGE UP (ref 0–0.2)
BASOPHILS NFR BLD AUTO: 0.9 % — SIGNIFICANT CHANGE UP (ref 0–2)
BILIRUB SERPL-MCNC: 1 MG/DL — SIGNIFICANT CHANGE UP (ref 0.2–1.2)
BUN SERPL-MCNC: 18 MG/DL — SIGNIFICANT CHANGE UP (ref 7–23)
CALCIUM SERPL-MCNC: 9.8 MG/DL — SIGNIFICANT CHANGE UP (ref 8.4–10.5)
CHLORIDE SERPL-SCNC: 98 MMOL/L — SIGNIFICANT CHANGE UP (ref 98–107)
CO2 SERPL-SCNC: 23 MMOL/L — SIGNIFICANT CHANGE UP (ref 22–31)
CREAT SERPL-MCNC: 0.9 MG/DL — SIGNIFICANT CHANGE UP (ref 0.5–1.3)
EGFR: 63 ML/MIN/1.73M2 — SIGNIFICANT CHANGE UP
EOSINOPHIL # BLD AUTO: 0.25 K/UL — SIGNIFICANT CHANGE UP (ref 0–0.5)
EOSINOPHIL NFR BLD AUTO: 3.2 % — SIGNIFICANT CHANGE UP (ref 0–6)
ESTIMATED AVERAGE GLUCOSE: 189 — SIGNIFICANT CHANGE UP
GLUCOSE SERPL-MCNC: 166 MG/DL — HIGH (ref 70–99)
HCT VFR BLD CALC: 42.1 % — SIGNIFICANT CHANGE UP (ref 34.5–45)
HGB BLD-MCNC: 14 G/DL — SIGNIFICANT CHANGE UP (ref 11.5–15.5)
IANC: 5.56 K/UL — SIGNIFICANT CHANGE UP (ref 1.8–7.4)
IMM GRANULOCYTES NFR BLD AUTO: 0.4 % — SIGNIFICANT CHANGE UP (ref 0–0.9)
LYMPHOCYTES # BLD AUTO: 1.31 K/UL — SIGNIFICANT CHANGE UP (ref 1–3.3)
LYMPHOCYTES # BLD AUTO: 16.9 % — SIGNIFICANT CHANGE UP (ref 13–44)
MCHC RBC-ENTMCNC: 28.6 PG — SIGNIFICANT CHANGE UP (ref 27–34)
MCHC RBC-ENTMCNC: 33.3 GM/DL — SIGNIFICANT CHANGE UP (ref 32–36)
MCV RBC AUTO: 85.9 FL — SIGNIFICANT CHANGE UP (ref 80–100)
MONOCYTES # BLD AUTO: 0.53 K/UL — SIGNIFICANT CHANGE UP (ref 0–0.9)
MONOCYTES NFR BLD AUTO: 6.8 % — SIGNIFICANT CHANGE UP (ref 2–14)
NEUTROPHILS # BLD AUTO: 5.56 K/UL — SIGNIFICANT CHANGE UP (ref 1.8–7.4)
NEUTROPHILS NFR BLD AUTO: 71.8 % — SIGNIFICANT CHANGE UP (ref 43–77)
NRBC # BLD: 0 /100 WBCS — SIGNIFICANT CHANGE UP (ref 0–0)
NRBC # FLD: 0 K/UL — SIGNIFICANT CHANGE UP (ref 0–0)
PLATELET # BLD AUTO: 246 K/UL — SIGNIFICANT CHANGE UP (ref 150–400)
POTASSIUM SERPL-MCNC: 4.1 MMOL/L — SIGNIFICANT CHANGE UP (ref 3.5–5.3)
POTASSIUM SERPL-SCNC: 4.1 MMOL/L — SIGNIFICANT CHANGE UP (ref 3.5–5.3)
PROT SERPL-MCNC: 7.9 G/DL — SIGNIFICANT CHANGE UP (ref 6–8.3)
RBC # BLD: 4.9 M/UL — SIGNIFICANT CHANGE UP (ref 3.8–5.2)
RBC # FLD: 13.2 % — SIGNIFICANT CHANGE UP (ref 10.3–14.5)
SODIUM SERPL-SCNC: 137 MMOL/L — SIGNIFICANT CHANGE UP (ref 135–145)
WBC # BLD: 7.75 K/UL — SIGNIFICANT CHANGE UP (ref 3.8–10.5)
WBC # FLD AUTO: 7.75 K/UL — SIGNIFICANT CHANGE UP (ref 3.8–10.5)

## 2023-08-12 PROCEDURE — 99232 SBSQ HOSP IP/OBS MODERATE 35: CPT | Mod: GC

## 2023-08-12 RX ORDER — METFORMIN HYDROCHLORIDE 850 MG/1
1 TABLET ORAL
Qty: 0 | Refills: 0 | DISCHARGE

## 2023-08-12 RX ORDER — MELOXICAM 15 MG/1
1 TABLET ORAL
Refills: 0 | DISCHARGE

## 2023-08-12 RX ORDER — CLOPIDOGREL BISULFATE 75 MG/1
1 TABLET, FILM COATED ORAL
Qty: 90 | Refills: 0
Start: 2023-08-12 | End: 2023-11-09

## 2023-08-12 RX ORDER — ASPIRIN/CALCIUM CARB/MAGNESIUM 324 MG
1 TABLET ORAL
Qty: 30 | Refills: 0
Start: 2023-08-12 | End: 2023-09-10

## 2023-08-12 RX ADMIN — Medication 81 MILLIGRAM(S): at 11:13

## 2023-08-12 RX ADMIN — LISINOPRIL 20 MILLIGRAM(S): 2.5 TABLET ORAL at 05:29

## 2023-08-12 RX ADMIN — CLOPIDOGREL BISULFATE 75 MILLIGRAM(S): 75 TABLET, FILM COATED ORAL at 11:12

## 2023-08-12 RX ADMIN — AMLODIPINE BESYLATE 10 MILLIGRAM(S): 2.5 TABLET ORAL at 05:29

## 2023-08-12 RX ADMIN — Medication 75 MICROGRAM(S): at 05:29

## 2023-08-12 RX ADMIN — SODIUM CHLORIDE 3 MILLILITER(S): 9 INJECTION INTRAMUSCULAR; INTRAVENOUS; SUBCUTANEOUS at 06:54

## 2023-08-12 RX ADMIN — FAMOTIDINE 20 MILLIGRAM(S): 10 INJECTION INTRAVENOUS at 11:12

## 2023-08-12 NOTE — DISCHARGE NOTE PROVIDER - NSDCFUSCHEDAPPT_GEN_ALL_CORE_FT
Mookie Shabazz  [facility]  ORTHOSURG [address] [facility]  Scheduled Appointment: 08/18/2023    Ed Murray  [facility]  VASCULAR [address]  Scheduled Appointment: 08/25/2023    [facility]  VASCULAR [address]  Scheduled Appointment: 08/25/2023

## 2023-08-12 NOTE — DISCHARGE NOTE PROVIDER - HOSPITAL COURSE
82 yo Female with a PMH of DM, HTN, HLD, GERD, CVA (no residuals),  hypothyroidism  presented today for elective coronary angiogram      s/p LHC via RRA: midLAD 90%-->DESx1,  distalOM2 60%  ASA Plavix for DAPT  Case discussed with Cardiology-stable for discharge today

## 2023-08-12 NOTE — PROGRESS NOTE ADULT - TIME BILLING
83-year-old woman with history of DM-, HTN, GERD, CVA and hypothyroidism. She underwent LHC for fatigue/SOB when walking 3-4 blocks and abnormal stress test. She underwent KURTIS in mLAD.

## 2023-08-12 NOTE — DISCHARGE NOTE NURSING/CASE MANAGEMENT/SOCIAL WORK - PATIENT PORTAL LINK FT
You can access the FollowMyHealth Patient Portal offered by [facility] by registering at the following website: [email or web address]. By joining [facility]’s FollowMyHealth portal, you will also be able to view your health information using other applications (apps) compatible with our system.

## 2023-08-12 NOTE — DISCHARGE NOTE PROVIDER - NSDCMRMEDTOKEN_GEN_ALL_CORE_FT
Adult Aspirin Regimen 81 mg oral delayed release tablet: 1 tab(s) orally once a day  amLODIPine 10 mg oral tablet: 1 tab(s) orally once a day  famotidine 20 mg oral tablet: 1 tab(s) orally once a day  Fish Oil oral capsule: 1 cap(s) orally once a day  levothyroxine 75 mcg (0.075 mg) oral tablet: 1 tab(s) orally once a day  lisinopril 20 mg oral tablet: 1 tab(s) orally once a day (at bedtime)  metFORMIN 500 mg oral tablet: 1 tab(s) orally 2 times a day do not take until 8/14/2023 Monday  Multiple Vitamins oral tablet: 1 tab(s) orally once a day  Plavix 75 mg oral tablet: 1 tab(s) orally once a day  rosuvastatin 10 mg oral tablet: 1 tab(s) orally once a day (at bedtime)  Tresiba FlexTouch 100 units/mL subcutaneous solution: 10 unit(s) subcutaneous once a day (at bedtime)  Vitamin B12: 1 tab(s) orally once a day  Vitamin D3 1000 intl units oral tablet: 1 tab(s) orally once a day

## 2023-08-12 NOTE — DISCHARGE NOTE PROVIDER - CARE PROVIDER_API CALL
Camille Marsh)  Medicine  Cardiology  [phone] [place] [address], [address]  [place], [place] [id number]  Phone: [phone]  Fax: [phone]  Established Patient  Follow Up Time:

## 2023-08-12 NOTE — DISCHARGE NOTE NURSING/CASE MANAGEMENT/SOCIAL WORK - NSDCPEFALRISK_GEN_ALL_CORE
For information on Fall & Injury Prevention, visit: [email or web address] OR  [email or web address] OR  https://www.cdc.gov/steadi/patient.html

## 2023-08-12 NOTE — PROGRESS NOTE ADULT - SUBJECTIVE AND OBJECTIVE BOX
Joelle Flanagan MD  Cardiology Fellow  [phone]  All Cardiology service information can be found 24/7 on [email or web address], password: [email or web address]    Patient seen and examined at bedside.    Overnight Events:   NAEON     Review Of Systems: No chest pain, shortness of breath, or palpitations            Current Meds:  amLODIPine   Tablet 10 milliGRAM(s) Oral daily  aspirin enteric coated 81 milliGRAM(s) Oral daily  atorvastatin 40 milliGRAM(s) Oral at bedtime  clopidogrel Tablet 75 milliGRAM(s) Oral daily  dextrose 5%. 1000 milliLiter(s) IV Continuous <Continuous>  dextrose 5%. 1000 milliLiter(s) IV Continuous <Continuous>  dextrose 50% Injectable 25 Gram(s) IV Push once  dextrose 50% Injectable 25 Gram(s) IV Push once  dextrose 50% Injectable 12.5 Gram(s) IV Push once  dextrose Oral Gel 15 Gram(s) Oral once PRN  famotidine    Tablet 20 milliGRAM(s) Oral daily  glucagon  Injectable 1 milliGRAM(s) IntraMuscular once  insulin lispro (ADMELOG) corrective regimen sliding scale   SubCutaneous three times a day before meals  levothyroxine 75 MICROGram(s) Oral daily  lisinopril 20 milliGRAM(s) Oral daily  sodium chloride 0.9% lock flush 3 milliLiter(s) IV Push every 8 hours  sodium chloride 0.9%. 1000 milliLiter(s) IV Continuous <Continuous>      Vitals:  T(F): 98 (08-12), Max: 98 (08-12)  HR: 64 (08-12) (53 - 75)  BP: 138/68 (08-12) (121/71 - 138/68)  RR: 18 (08-12)  SpO2: 98% (08-12)  I&O's Summary      Physical Exam:  Appearance: No acute distress; well appearing  Eyes: PERRL, EOMI, pink conjunctiva  HEENT: Normal oral mucosa  Cardiovascular: RRR, S1, S2, no murmurs, rubs, or gallops; no edema; no JVD  Respiratory: Clear to auscultation bilaterally  Gastrointestinal: soft, non-tender, non-distended with normal bowel sounds  Musculoskeletal: No clubbing; no joint deformity   Neurologic: Non-focal  Lymphatic: No lymphadenopathy  Psychiatry: AAOx3, mood & affect appropriate  Skin: see below for wrist check                         12.8   6.12  )-----------( 226      ( 11 Aug 2023 06:55 )             39.4     08-11    135  |  98  |  21  ----------------------------<  108<H>  4.3   |  26  |  0.85    Ca    9.6      11 Aug 2023 06:55                    New ECG(s): Personally reviewed    Interpretation of Telemetry:  SB-SR 49-85

## 2023-08-12 NOTE — PROGRESS NOTE ADULT - ASSESSMENT
83 PMHx T2DM< HTN, GERD, CVA, hypothyoidism s/p LHC     # s/p KURTIS in mLAD   - R radial  site benign, no hematoma or ecchymosis  - continue DAPT ). Monitor for signs and symptoms of bleeding  - continue high dose statin  - Patient with HRs in the 50s, monitor outpatient for BB initiation   - continue ACEi/ARB. Monitor kidney function reece with contrast use during procedure.  - okay for d/c  follow up with outpatient cardiology clinic within 2-3 weeks.

## 2023-08-12 NOTE — DISCHARGE NOTE PROVIDER - NSDCCPCAREPLAN_GEN_ALL_CORE_FT
PRINCIPAL DISCHARGE DIAGNOSIS  Diagnosis: CAD (coronary artery disease)  Assessment and Plan of Treatment: Start aspirin,Plavix  Continue Crestor.  Follow up with your cardiologist in one week      SECONDARY DISCHARGE DIAGNOSES  Diagnosis: Diabetes mellitus  Assessment and Plan of Treatment: Stop glyburide until follow up with your primary care doctor.  Restart Metformin on monday 8/14/2023.  Continue tresiba  1800 calorie diabetic diet/ low salt, low fat , low cholesterol diet    Diagnosis: Hyperlipidemia  Assessment and Plan of Treatment: Continue Crestor    Diagnosis: Hypertension  Assessment and Plan of Treatment: Low salt, low fat, low cholesterol   Continue Lisinopril, amlodipine

## 2023-08-12 NOTE — PROGRESS NOTE ADULT - ATTENDING COMMENTS
Kanchan Bernard is an 83-year-old woman with history of DM-, HTN, GERD, CVA and hypothyroidism. She underwent LHC for fatigue/SOB when walking 3-4 blocks and abnormal stress test. She underwent KURTIS in mLAD.  R radial site of percutaneous entry appeared healing, with no hematoma or ecchymosis. Management includes maintaining DAPT, high intensity statin and ACE-I. Maintain EC aspirin 81 mg daily, atorvastatin 40 mg oral at bedtime, clopidogrel (Plavix) 75 mg daily, famotidine   20 mg oral daily, levothyroxine 75 mCg oral daily, and lisinopril 20 mg oral daily. Given baseline HR 50s bpm, monitor consider beta blocker after additional monitoring. She will need outpatient check of serum creatinine given contrast and use of ACEi. She will follow up with outpatient cardiology clinic within 2-3 weeks

## 2023-08-18 ENCOUNTER — APPOINTMENT (OUTPATIENT)
Dept: ORTHOPEDIC SURGERY | Facility: CLINIC | Age: 83
End: 2023-08-18

## 2023-08-25 ENCOUNTER — APPOINTMENT (OUTPATIENT)
Dept: VASCULAR SURGERY | Facility: CLINIC | Age: 83
End: 2023-08-25
Payer: MEDICARE

## 2023-08-25 VITALS
SYSTOLIC BLOOD PRESSURE: 169 MMHG | WEIGHT: 98 LBS | BODY MASS INDEX: 19.76 KG/M2 | DIASTOLIC BLOOD PRESSURE: 72 MMHG | HEART RATE: 57 BPM | HEIGHT: 59 IN | TEMPERATURE: 97.7 F

## 2023-08-25 DIAGNOSIS — G95.19 OTHER VASCULAR MYELOPATHIES: ICD-10-CM

## 2023-08-25 PROCEDURE — 99204 OFFICE O/P NEW MOD 45 MIN: CPT

## 2023-08-25 PROCEDURE — 93922 UPR/L XTREMITY ART 2 LEVELS: CPT

## 2023-08-25 RX ORDER — CLOPIDOGREL 75 MG/1
75 TABLET, FILM COATED ORAL
Refills: 0 | Status: ACTIVE | COMMUNITY

## 2023-08-25 RX ORDER — INSULIN DEGLUDEC INJECTION 100 U/ML
100 INJECTION, SOLUTION SUBCUTANEOUS
Refills: 0 | Status: ACTIVE | COMMUNITY

## 2023-08-25 RX ORDER — METFORMIN HYDROCHLORIDE 625 MG/1
TABLET ORAL
Refills: 0 | Status: ACTIVE | COMMUNITY

## 2023-08-25 RX ORDER — EMPAGLIFLOZIN 10 MG/1
10 TABLET, FILM COATED ORAL
Refills: 0 | Status: ACTIVE | COMMUNITY

## 2023-08-25 RX ORDER — ASCORBIC ACID 500 MG
TABLET ORAL
Refills: 0 | Status: ACTIVE | COMMUNITY

## 2023-08-25 RX ORDER — ROSUVASTATIN CALCIUM 5 MG/1
TABLET, FILM COATED ORAL
Refills: 0 | Status: ACTIVE | COMMUNITY

## 2023-08-25 RX ORDER — METOPROLOL TARTRATE 75 MG/1
TABLET, FILM COATED ORAL
Refills: 0 | Status: ACTIVE | COMMUNITY

## 2023-08-25 RX ORDER — ASPIRIN 81 MG
81 TABLET,CHEWABLE ORAL
Refills: 0 | Status: ACTIVE | COMMUNITY

## 2023-08-25 RX ORDER — ADHESIVE TAPE 3"X 2.3 YD
50 MCG TAPE, NON-MEDICATED TOPICAL
Refills: 0 | Status: ACTIVE | COMMUNITY

## 2023-08-25 RX ORDER — UBIDECARENONE 30 MG
CAPSULE ORAL
Refills: 0 | Status: ACTIVE | COMMUNITY

## 2023-08-25 RX ORDER — OMEGA-3/DHA/EPA/FISH OIL 300-1000MG
1000 CAPSULE ORAL
Refills: 0 | Status: ACTIVE | COMMUNITY

## 2023-08-25 NOTE — ASSESSMENT
[FreeTextEntry1] : pt w claudication sx, normal arterial flow studies probably neurogenic claudication, suggest spine eval  prn fu

## 2023-08-25 NOTE — HISTORY OF PRESENT ILLNESS
[FreeTextEntry1] : 83F, recent card cath, pci.  reports bilat calf/thigh pain, w ambulation.  predictable, resolves w rest.  approx 1 year no non healing wounds or ulcers.  occasional SOB w long distance ambulation has been to "metro vein", told to see vasc specialist no smoking + DM, + CAD  no prior lower ext surgery or trauma

## 2023-08-25 NOTE — PHYSICAL EXAM
[Respiratory Effort] : normal respiratory effort [Normal Rate and Rhythm] : normal rate and rhythm [JVD] : no jugular venous distention  [de-identified] : awake, alert [FreeTextEntry1] : difucult to palpate pedal, R slightly palp, L non palp   [de-identified] : no gross motor/sensory deficits

## 2023-09-12 ENCOUNTER — APPOINTMENT (OUTPATIENT)
Dept: ORTHOPEDIC SURGERY | Facility: CLINIC | Age: 83
End: 2023-09-12
Payer: MEDICARE

## 2023-09-12 VITALS — HEIGHT: 59 IN | BODY MASS INDEX: 19.76 KG/M2 | WEIGHT: 98 LBS

## 2023-09-12 DIAGNOSIS — M17.11 UNILATERAL PRIMARY OSTEOARTHRITIS, RIGHT KNEE: ICD-10-CM

## 2023-09-12 PROCEDURE — 73564 X-RAY EXAM KNEE 4 OR MORE: CPT | Mod: RT

## 2023-09-12 PROCEDURE — 99214 OFFICE O/P EST MOD 30 MIN: CPT

## 2023-12-06 ENCOUNTER — RX RENEWAL (OUTPATIENT)
Age: 83
End: 2023-12-06

## 2024-03-17 ENCOUNTER — RX RENEWAL (OUTPATIENT)
Age: 84
End: 2024-03-17

## 2024-03-17 RX ORDER — MELOXICAM 15 MG/1
15 TABLET ORAL
Qty: 30 | Refills: 0 | Status: ACTIVE | COMMUNITY
Start: 2023-09-12 | End: 1900-01-01

## 2025-05-07 ENCOUNTER — APPOINTMENT (OUTPATIENT)
Dept: ORTHOPEDIC SURGERY | Facility: CLINIC | Age: 85
End: 2025-05-07
Payer: MEDICARE

## 2025-05-07 VITALS — BODY MASS INDEX: 22.18 KG/M2 | WEIGHT: 110 LBS | HEIGHT: 59 IN

## 2025-05-07 DIAGNOSIS — M17.11 UNILATERAL PRIMARY OSTEOARTHRITIS, RIGHT KNEE: ICD-10-CM

## 2025-05-07 PROCEDURE — 99214 OFFICE O/P EST MOD 30 MIN: CPT

## 2025-05-07 PROCEDURE — 73564 X-RAY EXAM KNEE 4 OR MORE: CPT | Mod: RT

## 2025-05-07 PROCEDURE — G2211 COMPLEX E/M VISIT ADD ON: CPT

## 2025-06-24 NOTE — PROGRESS NOTE ADULT - PROBLEM SELECTOR PLAN 5
Patient returning call. He is in agreement with starting metoprolol, pharmacy pended.     Patient also reports difficulties filling the Zetia prescription. Prescription resent to [facility] pharmacy with refills adjusted based off original prescription date, per patient request.     Nola Clark RN     - C/w home synthroid 88mcg   -  TSH 25, FT4 0.64, will increase Synthroid to 100 mcg

## 2025-09-02 ENCOUNTER — APPOINTMENT (OUTPATIENT)
Dept: ORTHOPEDIC SURGERY | Facility: CLINIC | Age: 85
End: 2025-09-02
Payer: MEDICARE

## 2025-09-02 VITALS — HEIGHT: 59 IN | WEIGHT: 108 LBS | BODY MASS INDEX: 21.77 KG/M2

## 2025-09-02 DIAGNOSIS — M17.11 UNILATERAL PRIMARY OSTEOARTHRITIS, RIGHT KNEE: ICD-10-CM

## 2025-09-02 PROCEDURE — 20610 DRAIN/INJ JOINT/BURSA W/O US: CPT | Mod: RT

## 2025-09-02 PROCEDURE — 73564 X-RAY EXAM KNEE 4 OR MORE: CPT | Mod: RT

## 2025-09-02 PROCEDURE — 99214 OFFICE O/P EST MOD 30 MIN: CPT | Mod: 25
